# Patient Record
Sex: MALE | Race: WHITE
[De-identification: names, ages, dates, MRNs, and addresses within clinical notes are randomized per-mention and may not be internally consistent; named-entity substitution may affect disease eponyms.]

---

## 2017-04-11 ENCOUNTER — PRIOR ORIGINAL RECORDS (OUTPATIENT)
Dept: OTHER | Age: 67
End: 2017-04-11

## 2017-04-19 ENCOUNTER — HOSPITAL ENCOUNTER (OUTPATIENT)
Dept: CARDIOLOGY CLINIC | Facility: HOSPITAL | Age: 67
Discharge: HOME | End: 2017-04-19
Attending: INTERNAL MEDICINE

## 2017-04-19 ENCOUNTER — MYAURORA ACCOUNT LINK (OUTPATIENT)
Dept: OTHER | Age: 67
End: 2017-04-19

## 2017-04-19 DIAGNOSIS — I25.10 CORONARY ARTERIOSCLEROSIS: ICD-10-CM

## 2017-04-20 ENCOUNTER — PRIOR ORIGINAL RECORDS (OUTPATIENT)
Dept: OTHER | Age: 67
End: 2017-04-20

## 2017-06-06 ENCOUNTER — PRIOR ORIGINAL RECORDS (OUTPATIENT)
Dept: OTHER | Age: 67
End: 2017-06-06

## 2017-10-04 ENCOUNTER — PRIOR ORIGINAL RECORDS (OUTPATIENT)
Dept: OTHER | Age: 67
End: 2017-10-04

## 2017-10-05 ENCOUNTER — HOSPITAL ENCOUNTER (OUTPATIENT)
Dept: GENERAL RADIOLOGY | Facility: HOSPITAL | Age: 67
Discharge: HOME | End: 2017-10-05
Attending: INTERNAL MEDICINE
Payer: MEDICARE

## 2017-10-05 ENCOUNTER — PRIOR ORIGINAL RECORDS (OUTPATIENT)
Dept: OTHER | Age: 67
End: 2017-10-05

## 2017-10-05 ENCOUNTER — LAB ENCOUNTER (OUTPATIENT)
Dept: LAB | Facility: HOSPITAL | Age: 67
End: 2017-10-05
Attending: INTERNAL MEDICINE
Payer: MEDICARE

## 2017-10-05 DIAGNOSIS — Z01.811 PRE-OP CHEST EXAM: ICD-10-CM

## 2017-10-05 DIAGNOSIS — I25.10 CORONARY ATHEROSCLEROSIS OF NATIVE CORONARY ARTERY: Primary | ICD-10-CM

## 2017-10-05 LAB
ALKALINE PHOSPHATATE(ALK PHOS): 65 IU/L
BILIRUBIN TOTAL: 0.49 MG/DL
BUN: 16 MG/DL
CALCIUM: 9.3 MG/DL
CHLORIDE: 105 MEQ/L
CHOLESTEROL, TOTAL: 139 MG/DL
CREATININE, SERUM: 1.13 MG/DL
GLUCOSE: 96 MG/DL
HDL CHOLESTEROL: 47 MG/DL
LDL CHOLESTEROL: 58 MG/DL
POTASSIUM, SERUM: 4 MEQ/L
PROTEIN, TOTAL: 7.4 G/DL
SGOT (AST): 22 IU/L
SGPT (ALT): 28 IU/L
SODIUM: 143 MEQ/L
TRIGLYCERIDES: 171 MG/DL

## 2017-10-05 PROCEDURE — 80048 BASIC METABOLIC PNL TOTAL CA: CPT

## 2017-10-05 PROCEDURE — 36415 COLL VENOUS BLD VENIPUNCTURE: CPT

## 2017-10-05 PROCEDURE — 85025 COMPLETE CBC W/AUTO DIFF WBC: CPT

## 2017-10-05 PROCEDURE — 71020 XR CHEST PA + LAT CHEST (CPT=71020): CPT | Performed by: INTERNAL MEDICINE

## 2017-10-05 NOTE — PROGRESS NOTES
Tressa Gallo  : 1950  ACCOUNT:  [id number]  [phone]  PCP: Dr. Dickson Faustin     TODAY'S DATE: 10/05/2017  DICTATED BY:  Toni Stewart M.D.]    CHIEF COMPLAINT: [Followup of .  CAD, of native vessels.]    HPI: [On 10/05/2017, Quentin Ayala, a 74 yea PAST HISTORY: back surgery    PAST CV HISTORY: 9/2012, cardiac catheterization September 2012, drug eluting stent, dyslipidemia, LAD 6/2015, PTCA and PTCA/Stent    FAMILY HISTORY: Negative for premature CAD. Negative for AAA.     SOCIAL HISTORY: SMOKING PLAN:  1. 25 mg of Toprol. 2. Cardiac catheterization on Monday, or sooner if symptoms warrant.     Santo Odonnell M.D.  LAVINIA/aníbal - DD: 10/05/2017 - DT: 10/05/2017 - Job ID: [id number] - c: Dr. Og Marie

## 2017-10-06 LAB
BUN: 19 MG/DL
CALCIUM: 9.3 MG/DL
CHLORIDE: 105 MEQ/L
CREATININE, SERUM: 1.22 MG/DL
GLUCOSE: 77 MG/DL
HEMATOCRIT: 42.5 %
HEMOGLOBIN: 15.6 G/DL
PLATELETS: 227 K/UL
POTASSIUM, SERUM: 4.7 MEQ/L
RED BLOOD COUNT: 4.43 X 10-6/U
SODIUM: 139 MEQ/L
WHITE BLOOD COUNT: 7.3 X 10-3/U

## 2017-10-09 ENCOUNTER — HOSPITAL ENCOUNTER (OUTPATIENT)
Dept: INTERVENTIONAL RADIOLOGY/VASCULAR | Facility: HOSPITAL | Age: 67
Discharge: HOME | End: 2017-10-10
Attending: INTERNAL MEDICINE | Admitting: INTERNAL MEDICINE
Payer: MEDICARE

## 2017-10-09 DIAGNOSIS — R07.9 CHEST PAIN: ICD-10-CM

## 2017-10-09 DIAGNOSIS — I25.10 CAD (CORONARY ARTERY DISEASE): ICD-10-CM

## 2017-10-09 PROCEDURE — 85347 COAGULATION TIME ACTIVATED: CPT

## 2017-10-09 PROCEDURE — 027034Z DILATION OF CORONARY ARTERY, ONE ARTERY WITH DRUG-ELUTING INTRALUMINAL DEVICE, PERCUTANEOUS APPROACH: ICD-10-PCS | Performed by: INTERNAL MEDICINE

## 2017-10-09 PROCEDURE — B2111ZZ FLUOROSCOPY OF MULTIPLE CORONARY ARTERIES USING LOW OSMOLAR CONTRAST: ICD-10-PCS | Performed by: INTERNAL MEDICINE

## 2017-10-09 PROCEDURE — 93005 ELECTROCARDIOGRAM TRACING: CPT

## 2017-10-09 PROCEDURE — B2151ZZ FLUOROSCOPY OF LEFT HEART USING LOW OSMOLAR CONTRAST: ICD-10-PCS | Performed by: INTERNAL MEDICINE

## 2017-10-09 PROCEDURE — 99153 MOD SED SAME PHYS/QHP EA: CPT

## 2017-10-09 PROCEDURE — 90471 IMMUNIZATION ADMIN: CPT

## 2017-10-09 PROCEDURE — 02C03ZZ EXTIRPATION OF MATTER FROM CORONARY ARTERY, ONE ARTERY, PERCUTANEOUS APPROACH: ICD-10-PCS | Performed by: INTERNAL MEDICINE

## 2017-10-09 PROCEDURE — 93010 ELECTROCARDIOGRAM REPORT: CPT | Performed by: INTERNAL MEDICINE

## 2017-10-09 PROCEDURE — 4A023N7 MEASUREMENT OF CARDIAC SAMPLING AND PRESSURE, LEFT HEART, PERCUTANEOUS APPROACH: ICD-10-PCS | Performed by: INTERNAL MEDICINE

## 2017-10-09 PROCEDURE — 93458 L HRT ARTERY/VENTRICLE ANGIO: CPT

## 2017-10-09 PROCEDURE — 3E07317 INTRODUCTION OF OTHER THROMBOLYTIC INTO CORONARY ARTERY, PERCUTANEOUS APPROACH: ICD-10-PCS | Performed by: INTERNAL MEDICINE

## 2017-10-09 PROCEDURE — 99152 MOD SED SAME PHYS/QHP 5/>YRS: CPT

## 2017-10-09 RX ORDER — CLOPIDOGREL BISULFATE 75 MG/1
75 TABLET ORAL DAILY
Status: DISCONTINUED | OUTPATIENT
Start: 2017-10-10 | End: 2017-10-10

## 2017-10-09 RX ORDER — LIDOCAINE HYDROCHLORIDE 10 MG/ML
INJECTION, SOLUTION INFILTRATION; PERINEURAL
Status: COMPLETED
Start: 2017-10-09 | End: 2017-10-09

## 2017-10-09 RX ORDER — NICOTINE 21 MG/24HR
1 PATCH, TRANSDERMAL 24 HOURS TRANSDERMAL DAILY
Status: DISCONTINUED | OUTPATIENT
Start: 2017-10-09 | End: 2017-10-09 | Stop reason: HOSPADM

## 2017-10-09 RX ORDER — HEPARIN SODIUM 5000 [USP'U]/ML
INJECTION, SOLUTION INTRAVENOUS; SUBCUTANEOUS
Status: COMPLETED
Start: 2017-10-09 | End: 2017-10-09

## 2017-10-09 RX ORDER — CLOPIDOGREL BISULFATE 75 MG/1
75 TABLET ORAL DAILY
Status: DISCONTINUED | OUTPATIENT
Start: 2017-10-09 | End: 2017-10-09 | Stop reason: SDUPTHER

## 2017-10-09 RX ORDER — ASPIRIN 325 MG
325 TABLET, DELAYED RELEASE (ENTERIC COATED) ORAL DAILY
Status: DISCONTINUED | OUTPATIENT
Start: 2017-10-09 | End: 2017-10-10

## 2017-10-09 RX ORDER — SODIUM CHLORIDE 9 MG/ML
INJECTION, SOLUTION INTRAVENOUS CONTINUOUS
Status: DISCONTINUED | OUTPATIENT
Start: 2017-10-09 | End: 2017-10-10

## 2017-10-09 RX ORDER — MIDAZOLAM HYDROCHLORIDE 1 MG/ML
INJECTION INTRAMUSCULAR; INTRAVENOUS
Status: COMPLETED
Start: 2017-10-09 | End: 2017-10-09

## 2017-10-09 RX ORDER — FLUOXETINE HYDROCHLORIDE 20 MG/1
20 CAPSULE ORAL DAILY
Status: DISCONTINUED | OUTPATIENT
Start: 2017-10-09 | End: 2017-10-10

## 2017-10-09 RX ORDER — SODIUM CHLORIDE 9 MG/ML
INJECTION, SOLUTION INTRAVENOUS CONTINUOUS
Status: ACTIVE | OUTPATIENT
Start: 2017-10-09 | End: 2017-10-09

## 2017-10-09 RX ORDER — ROSUVASTATIN CALCIUM 20 MG/1
20 TABLET, COATED ORAL NIGHTLY
Status: DISCONTINUED | OUTPATIENT
Start: 2017-10-09 | End: 2017-10-10

## 2017-10-09 RX ORDER — ALPRAZOLAM 1 MG/1
1 TABLET ORAL 3 TIMES DAILY PRN
Status: DISCONTINUED | OUTPATIENT
Start: 2017-10-09 | End: 2017-10-10

## 2017-10-09 RX ADMIN — ROSUVASTATIN CALCIUM 20 MG: 20 TABLET, COATED ORAL at 20:20:00

## 2017-10-09 RX ADMIN — SODIUM CHLORIDE: 9 INJECTION, SOLUTION INTRAVENOUS at 12:00:00

## 2017-10-09 RX ADMIN — ALPRAZOLAM 1 MG: 1 TABLET ORAL at 20:23:00

## 2017-10-09 RX ADMIN — FLUOXETINE HYDROCHLORIDE 20 MG: 20 CAPSULE ORAL at 20:20:00

## 2017-10-09 RX ADMIN — NICOTINE 1 PATCH: 21 MG/24HR PATCH, TRANSDERMAL 24 HOURS TRANSDERMAL at 15:15:00

## 2017-10-09 NOTE — H&P
History & Physical Examination    Patient Name: Steven La Coste  MRN: [id number]  [facility]: [id number]  YOB: 1950    Diagnosis: Coronary artery disease, unstable angina    Present Illness: Cardiac catheterization      Prescriptions Prior to Admission: Comment: angiogram, negative  7/31/12: OTHER SURGICAL HISTORY      Comment: Vahid Hopper  09/12: OTHER SURGICAL HISTORY      Comment: stent  Family History   Problem Relation Age of Onset   • Heart Disorder Father    • Heart Disease Father    • Cancer

## 2017-10-09 NOTE — PROCEDURES
[facility]  PCI Procedure Note    Gerry Rouse Location: Cath Lab    CSN [id number] MRN [id number]   Admission Date 10/9/2017 Procedure Date 10/9/2017   Attending Physician Colette Burroughs MD Procedure Physician Gabe Grant MD     Pre-Procedure Cinthya Louise device. IV was maintained by RN and moderate conscious sedation of Versed and fentanyl was given. Patient was assessed and monitoring of oxygen, heart rate and blood pressure by nurse and myself during the exam from 1252 -1337 PM      Impression:  1.

## 2017-10-09 NOTE — PLAN OF CARE
Electrolytes maintained within normal limits Progressing      Patient/Family Long Term Goal Progressing      Patient/Family Short Term Goal Progressing      Incision(s), wounds(s) or drain site(s) healing without S/S of infection Progressing    RECEIVED FR

## 2017-10-09 NOTE — PROGRESS NOTES
Rc'd pt from cath lab in stable condition. VSS. Dressing to right groin with angio-seal is soft, clean and dry. No bleeding or hematoma. Angiomax just completed. Pt to remain flat for 2hrs. Pt denies c/o pain or discomfort. Wife at bedside.  Dr Sabiha Watson at be

## 2017-10-10 ENCOUNTER — PRIOR ORIGINAL RECORDS (OUTPATIENT)
Dept: OTHER | Age: 67
End: 2017-10-10

## 2017-10-10 VITALS
OXYGEN SATURATION: 100 % | HEIGHT: 72 IN | DIASTOLIC BLOOD PRESSURE: 63 MMHG | RESPIRATION RATE: 17 BRPM | HEART RATE: 59 BPM | WEIGHT: 187 LBS | TEMPERATURE: 98 F | BODY MASS INDEX: 25.33 KG/M2 | SYSTOLIC BLOOD PRESSURE: 101 MMHG

## 2017-10-10 PROCEDURE — 99211 OFF/OP EST MAY X REQ PHY/QHP: CPT

## 2017-10-10 PROCEDURE — 85027 COMPLETE CBC AUTOMATED: CPT | Performed by: INTERNAL MEDICINE

## 2017-10-10 PROCEDURE — 80048 BASIC METABOLIC PNL TOTAL CA: CPT | Performed by: INTERNAL MEDICINE

## 2017-10-10 RX ADMIN — ASPIRIN 325 MG: 325 MG TABLET, DELAYED RELEASE (ENTERIC COATED) ORAL at 09:15:00

## 2017-10-10 RX ADMIN — CLOPIDOGREL BISULFATE 75 MG: 75 TABLET ORAL at 09:15:00

## 2017-10-10 RX ADMIN — FLUOXETINE HYDROCHLORIDE 20 MG: 20 CAPSULE ORAL at 09:16:00

## 2017-10-10 NOTE — CARDIAC REHAB
CAD education complete with patient and family, refused Phase II appointment at this time.  Phone number placed in Patient Instructions, told patient to follow up with us after seeing his cardiologist.

## 2017-10-10 NOTE — PLAN OF CARE
Patient is alert and oriented. Saturates well on room air, NSR on the tele. Patient denies any pain or SOB. Right groin is soft, non tender, no swelling noted. Patient ambulates ad edwar. POC updated with patient.  Call light within reach, will continue to

## 2017-10-10 NOTE — PLAN OF CARE
SKIN/TISSUE INTEGRITY - ADULT    • Incision(s), wounds(s) or drain site(s) healing without S/S of infection Progressing            S/P angiogram  JIM to LAD  Rt groin angioseal.  Site clean, dry and soft. Patient denies pain.     Patient given 74 and over

## 2017-10-10 NOTE — DIETARY NOTE
Nutrition Short Note   Dietitian consult received per cardiac rehab standing order. Pt to be educated by cardiac rehab staff and encouraged to attend outpatient classes taught by JOSUE. JOSUE available PRN.      Robbie Smith RD, LDN  Pager [phone]

## 2017-10-10 NOTE — PROGRESS NOTES
[facility]  Progress Note    Ney Gibson Patient Status:  Outpatient in a Bed    1950 MRN [id number]   [facility] 8NE-A Attending Ochoa Madrigal MD   Hosp Day # 0 PCP Oscar Sanchez MD     Subjective:  Patient seen and examin Medications:    Current Facility-Administered Medications:  0.9%  NaCl infusion  Intravenous Continuous   Clopidogrel Bisulfate (PLAVIX) tab 75 mg 75 mg Oral Daily   ALPRAZolam (XANAX) tab 1 mg 1 mg Oral TID PRN   aspirin EC EC tab 325 mg 325 mg Oral

## 2017-10-13 ENCOUNTER — PRIOR ORIGINAL RECORDS (OUTPATIENT)
Dept: OTHER | Age: 67
End: 2017-10-13

## 2017-10-23 ENCOUNTER — MYAURORA ACCOUNT LINK (OUTPATIENT)
Dept: OTHER | Age: 67
End: 2017-10-23

## 2017-10-23 ENCOUNTER — PRIOR ORIGINAL RECORDS (OUTPATIENT)
Dept: OTHER | Age: 67
End: 2017-10-23

## 2017-11-07 LAB
BUN: 11 MG/DL
CALCIUM: 8.5 MG/DL
CHLORIDE: 108 MEQ/L
CREATININE, SERUM: 1 MG/DL
GLUCOSE: 111 MG/DL
HEMOGLOBIN A1C: 5 %
POTASSIUM, SERUM: 3.8 MEQ/L
SODIUM: 137 MEQ/L
THYROID STIMULATING HORMONE: 1.82 MLU/L
VITAMIN D 25-OH: 31.63 NG/ML

## 2018-03-07 ENCOUNTER — PRIOR ORIGINAL RECORDS (OUTPATIENT)
Dept: OTHER | Age: 68
End: 2018-03-07

## 2018-04-25 LAB
ALKALINE PHOSPHATATE(ALK PHOS): 65 IU/L
BILIRUBIN TOTAL: 0.63 MG/DL
BUN: 17 MG/DL
CALCIUM: 9.3 MG/DL
CHLORIDE: 104 MEQ/L
CHOLESTEROL, TOTAL: 142 MG/DL
CREATININE, SERUM: 1.17 MG/DL
GLUCOSE: 80 MG/DL
HDL CHOLESTEROL: 43 MG/DL
LDL CHOLESTEROL: 67 MG/DL
POTASSIUM, SERUM: 4 MEQ/L
PROTEIN, TOTAL: 7.1 G/DL
SGOT (AST): 24 IU/L
SGPT (ALT): 31 IU/L
SODIUM: 142 MEQ/L
THYROID STIMULATING HORMONE: 1.45 MLU/L
TRIGLYCERIDES: 159 MG/DL

## 2018-04-26 ENCOUNTER — PRIOR ORIGINAL RECORDS (OUTPATIENT)
Dept: OTHER | Age: 68
End: 2018-04-26

## 2018-11-01 ENCOUNTER — PRIOR ORIGINAL RECORDS (OUTPATIENT)
Dept: OTHER | Age: 68
End: 2018-11-01

## 2018-11-01 ENCOUNTER — MYAURORA ACCOUNT LINK (OUTPATIENT)
Dept: OTHER | Age: 68
End: 2018-11-01

## 2019-01-01 ENCOUNTER — EXTERNAL RECORD (OUTPATIENT)
Dept: HEALTH INFORMATION MANAGEMENT | Facility: OTHER | Age: 69
End: 2019-01-01

## 2019-01-02 PROCEDURE — 87081 CULTURE SCREEN ONLY: CPT | Performed by: PHYSICIAN ASSISTANT

## 2019-01-10 PROCEDURE — 84154 ASSAY OF PSA FREE: CPT | Performed by: INTERNAL MEDICINE

## 2019-01-10 PROCEDURE — 84153 ASSAY OF PSA TOTAL: CPT | Performed by: INTERNAL MEDICINE

## 2019-02-28 VITALS
DIASTOLIC BLOOD PRESSURE: 82 MMHG | SYSTOLIC BLOOD PRESSURE: 114 MMHG | HEIGHT: 73 IN | BODY MASS INDEX: 24.78 KG/M2 | HEART RATE: 96 BPM | WEIGHT: 187 LBS

## 2019-02-28 VITALS
HEIGHT: 73 IN | DIASTOLIC BLOOD PRESSURE: 80 MMHG | BODY MASS INDEX: 24.65 KG/M2 | SYSTOLIC BLOOD PRESSURE: 122 MMHG | HEART RATE: 92 BPM | WEIGHT: 186 LBS

## 2019-02-28 VITALS
SYSTOLIC BLOOD PRESSURE: 120 MMHG | WEIGHT: 188 LBS | HEART RATE: 84 BPM | BODY MASS INDEX: 24.92 KG/M2 | HEIGHT: 73 IN | DIASTOLIC BLOOD PRESSURE: 70 MMHG

## 2019-02-28 VITALS
SYSTOLIC BLOOD PRESSURE: 120 MMHG | HEART RATE: 80 BPM | WEIGHT: 183 LBS | HEIGHT: 73 IN | BODY MASS INDEX: 24.25 KG/M2 | DIASTOLIC BLOOD PRESSURE: 82 MMHG

## 2019-03-01 VITALS
DIASTOLIC BLOOD PRESSURE: 62 MMHG | BODY MASS INDEX: 25.47 KG/M2 | HEART RATE: 83 BPM | WEIGHT: 188 LBS | SYSTOLIC BLOOD PRESSURE: 128 MMHG | HEIGHT: 72 IN

## 2019-03-22 RX ORDER — FLUOXETINE 20 MG/1
TABLET, FILM COATED ORAL
COMMUNITY

## 2019-03-22 RX ORDER — ROSUVASTATIN CALCIUM 10 MG/1
1 TABLET, COATED ORAL AT BEDTIME
COMMUNITY
Start: 2012-09-20

## 2019-03-22 RX ORDER — CLOPIDOGREL BISULFATE 75 MG/1
1 TABLET ORAL DAILY
COMMUNITY
Start: 2017-08-09 | End: 2021-02-22 | Stop reason: SDUPTHER

## 2019-03-22 RX ORDER — ALPRAZOLAM 2 MG/1
TABLET ORAL
COMMUNITY

## 2019-04-29 ENCOUNTER — TELEPHONE (OUTPATIENT)
Dept: CARDIOLOGY | Age: 69
End: 2019-04-29

## 2019-05-08 ENCOUNTER — OFFICE VISIT (OUTPATIENT)
Dept: CARDIOLOGY | Age: 69
End: 2019-05-08

## 2019-05-08 VITALS
HEART RATE: 72 BPM | WEIGHT: 190 LBS | SYSTOLIC BLOOD PRESSURE: 122 MMHG | BODY MASS INDEX: 25.73 KG/M2 | HEIGHT: 72 IN | DIASTOLIC BLOOD PRESSURE: 70 MMHG

## 2019-05-08 DIAGNOSIS — E78.2 MIXED HYPERLIPIDEMIA: ICD-10-CM

## 2019-05-08 DIAGNOSIS — I25.118 CORONARY ARTERY DISEASE OF NATIVE ARTERY OF NATIVE HEART WITH STABLE ANGINA PECTORIS (CMD): Primary | ICD-10-CM

## 2019-05-08 PROCEDURE — 99211 OFF/OP EST MAY X REQ PHY/QHP: CPT | Performed by: INTERNAL MEDICINE

## 2019-05-08 ASSESSMENT — ENCOUNTER SYMPTOMS
BRUISES/BLEEDS EASILY: 0
WEIGHT GAIN: 0
HEMOPTYSIS: 0
CHILLS: 0
WEIGHT LOSS: 0
COUGH: 0
ALLERGIC/IMMUNOLOGIC COMMENTS: NO NEW FOOD ALLERGIES
SUSPICIOUS LESIONS: 0
HEMATOCHEZIA: 0
FEVER: 0

## 2019-06-05 ENCOUNTER — TELEPHONE (OUTPATIENT)
Dept: CARDIOLOGY | Age: 69
End: 2019-06-05

## 2019-06-06 ENCOUNTER — TELEPHONE (OUTPATIENT)
Dept: CARDIOLOGY | Age: 69
End: 2019-06-06

## 2019-06-06 ENCOUNTER — OFFICE VISIT (OUTPATIENT)
Dept: CARDIOLOGY | Age: 69
End: 2019-06-06

## 2019-06-06 VITALS
HEART RATE: 72 BPM | HEIGHT: 72 IN | SYSTOLIC BLOOD PRESSURE: 116 MMHG | BODY MASS INDEX: 25.87 KG/M2 | DIASTOLIC BLOOD PRESSURE: 78 MMHG | WEIGHT: 191 LBS

## 2019-06-06 DIAGNOSIS — E78.2 MIXED HYPERLIPIDEMIA: ICD-10-CM

## 2019-06-06 DIAGNOSIS — I65.29 STENOSIS OF CAROTID ARTERY, UNSPECIFIED LATERALITY: ICD-10-CM

## 2019-06-06 DIAGNOSIS — R07.9 CHEST PAIN, UNSPECIFIED TYPE: ICD-10-CM

## 2019-06-06 DIAGNOSIS — Z95.5 PRESENCE OF STENT IN LAD CORONARY ARTERY: ICD-10-CM

## 2019-06-06 DIAGNOSIS — Z87.891 HISTORY OF SMOKING: ICD-10-CM

## 2019-06-06 DIAGNOSIS — I25.10 CORONARY ARTERY DISEASE INVOLVING NATIVE HEART WITHOUT ANGINA PECTORIS, UNSPECIFIED VESSEL OR LESION TYPE: Primary | ICD-10-CM

## 2019-06-06 PROCEDURE — 99214 OFFICE O/P EST MOD 30 MIN: CPT | Performed by: INTERNAL MEDICINE

## 2019-06-06 PROCEDURE — 93000 ELECTROCARDIOGRAM COMPLETE: CPT | Performed by: INTERNAL MEDICINE

## 2019-06-06 SDOH — HEALTH STABILITY: PHYSICAL HEALTH: ON AVERAGE, HOW MANY MINUTES DO YOU ENGAGE IN EXERCISE AT THIS LEVEL?: 0 MIN

## 2019-06-06 SDOH — HEALTH STABILITY: PHYSICAL HEALTH: ON AVERAGE, HOW MANY DAYS PER WEEK DO YOU ENGAGE IN MODERATE TO STRENUOUS EXERCISE (LIKE A BRISK WALK)?: 0 DAYS

## 2019-06-06 ASSESSMENT — ENCOUNTER SYMPTOMS
SHORTNESS OF BREATH: 1
FEVER: 0
WEIGHT LOSS: 0
WEIGHT GAIN: 0
CHILLS: 0
HEMATOCHEZIA: 0
HEMOPTYSIS: 0
BRUISES/BLEEDS EASILY: 0
COUGH: 0
SUSPICIOUS LESIONS: 0
ALLERGIC/IMMUNOLOGIC COMMENTS: NO NEW FOOD ALLERGIES

## 2019-06-11 ENCOUNTER — HOSPITAL ENCOUNTER (OUTPATIENT)
Dept: GENERAL RADIOLOGY | Facility: HOSPITAL | Age: 69
Discharge: HOME | End: 2019-06-11
Attending: INTERNAL MEDICINE
Payer: MEDICARE

## 2019-06-11 ENCOUNTER — LAB ENCOUNTER (OUTPATIENT)
Dept: LAB | Facility: HOSPITAL | Age: 69
End: 2019-06-11
Attending: INTERNAL MEDICINE
Payer: MEDICARE

## 2019-06-11 DIAGNOSIS — I25.10 CAD (CORONARY ARTERY DISEASE): ICD-10-CM

## 2019-06-11 DIAGNOSIS — R07.9 CHEST PAIN, UNSPECIFIED: Primary | ICD-10-CM

## 2019-06-11 DIAGNOSIS — R07.9 CHEST PAIN, UNSPECIFIED TYPE: ICD-10-CM

## 2019-06-11 PROCEDURE — 80048 BASIC METABOLIC PNL TOTAL CA: CPT

## 2019-06-11 PROCEDURE — 36415 COLL VENOUS BLD VENIPUNCTURE: CPT

## 2019-06-11 PROCEDURE — 85027 COMPLETE CBC AUTOMATED: CPT

## 2019-06-11 PROCEDURE — 71046 X-RAY EXAM CHEST 2 VIEWS: CPT | Performed by: INTERNAL MEDICINE

## 2019-06-13 DIAGNOSIS — I25.10 CORONARY ARTERY DISEASE INVOLVING NATIVE HEART WITHOUT ANGINA PECTORIS, UNSPECIFIED VESSEL OR LESION TYPE: ICD-10-CM

## 2019-06-13 DIAGNOSIS — R07.9 CHEST PAIN, UNSPECIFIED TYPE: ICD-10-CM

## 2019-06-13 DIAGNOSIS — I65.29 STENOSIS OF CAROTID ARTERY, UNSPECIFIED LATERALITY: ICD-10-CM

## 2019-06-18 NOTE — H&P
Miguel Blas MD - 2019 9:15 AM CDT  Formatting of this note might be different from the original.  2019    [address] [place] [address] [facility]  Clinic Note    Lynne Mccord  : 1950  PCP: medications for this visit. Review of Systems:  Review of Systems   Constitution: Negative for chills, fever, weight gain and weight loss. HENT: Negative for hearing loss.    Eyes:   Patient denies significant visual changes   Cardiovascular: Positive or lesion type   2. Stenosis of carotid artery, unspecified laterality   3. Mixed hyperlipidemia   4. History of smoking   5.  Presence of stent in LAD coronary artery     82-year-old gentleman with long history of coronary artery disease, history of multip

## 2019-06-19 ENCOUNTER — APPOINTMENT (OUTPATIENT)
Dept: CARDIOLOGY | Age: 69
End: 2019-06-19

## 2019-06-19 ENCOUNTER — HOSPITAL ENCOUNTER (OUTPATIENT)
Dept: INTERVENTIONAL RADIOLOGY/VASCULAR | Facility: HOSPITAL | Age: 69
Discharge: HOME | End: 2019-06-19
Attending: INTERNAL MEDICINE | Admitting: INTERNAL MEDICINE
Payer: MEDICARE

## 2019-06-19 VITALS
SYSTOLIC BLOOD PRESSURE: 103 MMHG | RESPIRATION RATE: 17 BRPM | OXYGEN SATURATION: 95 % | DIASTOLIC BLOOD PRESSURE: 65 MMHG | HEART RATE: 62 BPM | HEIGHT: 72 IN | WEIGHT: 191 LBS | TEMPERATURE: 98 F | BODY MASS INDEX: 25.87 KG/M2

## 2019-06-19 DIAGNOSIS — R07.9 CHEST PAIN: ICD-10-CM

## 2019-06-19 DIAGNOSIS — I25.10 CAD (CORONARY ARTERY DISEASE): ICD-10-CM

## 2019-06-19 PROCEDURE — B2111ZZ FLUOROSCOPY OF MULTIPLE CORONARY ARTERIES USING LOW OSMOLAR CONTRAST: ICD-10-PCS | Performed by: INTERNAL MEDICINE

## 2019-06-19 PROCEDURE — 93458 L HRT ARTERY/VENTRICLE ANGIO: CPT

## 2019-06-19 PROCEDURE — 99152 MOD SED SAME PHYS/QHP 5/>YRS: CPT

## 2019-06-19 PROCEDURE — 93458 L HRT ARTERY/VENTRICLE ANGIO: CPT | Performed by: INTERNAL MEDICINE

## 2019-06-19 PROCEDURE — 4A023N7 MEASUREMENT OF CARDIAC SAMPLING AND PRESSURE, LEFT HEART, PERCUTANEOUS APPROACH: ICD-10-PCS | Performed by: INTERNAL MEDICINE

## 2019-06-19 PROCEDURE — B2151ZZ FLUOROSCOPY OF LEFT HEART USING LOW OSMOLAR CONTRAST: ICD-10-PCS | Performed by: INTERNAL MEDICINE

## 2019-06-19 PROCEDURE — 99153 MOD SED SAME PHYS/QHP EA: CPT

## 2019-06-19 RX ORDER — LIDOCAINE HYDROCHLORIDE 10 MG/ML
INJECTION, SOLUTION EPIDURAL; INFILTRATION; INTRACAUDAL; PERINEURAL
Status: COMPLETED
Start: 2019-06-19 | End: 2019-06-19

## 2019-06-19 RX ORDER — VERAPAMIL HYDROCHLORIDE 2.5 MG/ML
INJECTION, SOLUTION INTRAVENOUS
Status: COMPLETED
Start: 2019-06-19 | End: 2019-06-19

## 2019-06-19 RX ORDER — HEPARIN SODIUM 5000 [USP'U]/ML
INJECTION, SOLUTION INTRAVENOUS; SUBCUTANEOUS
Status: COMPLETED
Start: 2019-06-19 | End: 2019-06-19

## 2019-06-19 RX ORDER — ASPIRIN 81 MG/1
TABLET, CHEWABLE ORAL
Status: COMPLETED
Start: 2019-06-19 | End: 2019-06-19

## 2019-06-19 RX ORDER — ASPIRIN 81 MG/1
324 TABLET, CHEWABLE ORAL ONCE
Status: COMPLETED | OUTPATIENT
Start: 2019-06-19 | End: 2019-06-19

## 2019-06-19 RX ORDER — SODIUM CHLORIDE 9 MG/ML
INJECTION, SOLUTION INTRAVENOUS CONTINUOUS
Status: DISCONTINUED | OUTPATIENT
Start: 2019-06-19 | End: 2019-06-19

## 2019-06-19 RX ORDER — SODIUM CHLORIDE 9 MG/ML
INJECTION, SOLUTION INTRAVENOUS
Status: COMPLETED | OUTPATIENT
Start: 2019-06-20 | End: 2019-06-19

## 2019-06-19 RX ORDER — MIDAZOLAM HYDROCHLORIDE 1 MG/ML
INJECTION INTRAMUSCULAR; INTRAVENOUS
Status: COMPLETED
Start: 2019-06-19 | End: 2019-06-19

## 2019-06-19 RX ORDER — ASPIRIN 81 MG/1
81 TABLET ORAL DAILY
Status: DISCONTINUED | OUTPATIENT
Start: 2019-06-20 | End: 2019-06-19

## 2019-06-19 RX ADMIN — SODIUM CHLORIDE: 9 INJECTION, SOLUTION INTRAVENOUS at 12:45:00

## 2019-06-19 RX ADMIN — ASPIRIN 324 MG: 81 TABLET, CHEWABLE ORAL at 09:47:00

## 2019-06-19 RX ADMIN — SODIUM CHLORIDE: 9 INJECTION, SOLUTION INTRAVENOUS at 09:32:00

## 2019-06-19 NOTE — PROGRESS NOTES
Pt s/p LHC with Dr. Kenton Lora. Pt recovered in holding. Pt arrived from cath lab with right radial vasc band in place with 13ml. After 1 hour in recovery air was removed from vasc band in increments over time until the band was completely removed.  No bleeding

## 2019-06-19 NOTE — H&P
History & Physical Examination    Patient Name: Don Rios  MRN: [id number]  CSN: [id number]  YOB: 1950    Diagnosis: Coronary artery disease, history of multiple prior interventions in the left anterior descending artery, recurrent symptoms HISTORY  09/12    stent     Family History   Problem Relation Age of Onset   • Heart Disorder Father    • Heart Disease Father    • Cancer Father    • Other (Other) Father    • Other (Other) Mother         CVA   • Stroke Sister    • Other (Other) Sister

## 2019-06-19 NOTE — PROCEDURES
[facility]  Angio via Radial Artery Procedure Note    Aldo Kerr Location: Cath Lab    CSN [id number] MRN [id number]   Admission Date 6/19/2019 Procedure Date 6/19/2019   Attending Physician Madelin Garcia MD Procedure Physician Trang Urrutia MD a right dominant system. The previously placed stents in the right coronary artery are widely patent. There is no evidence of significant in-stent restenosis  2 the left main coronary artery has no significant disease  3.   The circumflex has no significa

## 2019-06-28 ENCOUNTER — HOSPITAL ENCOUNTER (OUTPATIENT)
Dept: CARDIOLOGY CLINIC | Facility: HOSPITAL | Age: 69
Discharge: HOME | End: 2019-06-28
Attending: INTERNAL MEDICINE
Payer: MEDICARE

## 2019-06-28 DIAGNOSIS — I65.29 STENOSIS OF CAROTID ARTERY, UNSPECIFIED LATERALITY: ICD-10-CM

## 2019-06-28 PROCEDURE — 93880 EXTRACRANIAL BILAT STUDY: CPT | Performed by: INTERNAL MEDICINE

## 2019-07-02 ENCOUNTER — TELEPHONE (OUTPATIENT)
Dept: CARDIOLOGY | Age: 69
End: 2019-07-02

## 2019-07-15 ENCOUNTER — OFFICE VISIT (OUTPATIENT)
Dept: CARDIOLOGY | Age: 69
End: 2019-07-15

## 2019-07-15 VITALS — DIASTOLIC BLOOD PRESSURE: 66 MMHG | SYSTOLIC BLOOD PRESSURE: 100 MMHG | HEART RATE: 60 BPM

## 2019-07-15 DIAGNOSIS — I65.29 STENOSIS OF CAROTID ARTERY, UNSPECIFIED LATERALITY: ICD-10-CM

## 2019-07-15 DIAGNOSIS — Z87.891 HISTORY OF SMOKING: ICD-10-CM

## 2019-07-15 DIAGNOSIS — E78.2 MIXED HYPERLIPIDEMIA: ICD-10-CM

## 2019-07-15 DIAGNOSIS — Z95.5 PRESENCE OF DRUG COATED STENT IN RIGHT CORONARY ARTERY: ICD-10-CM

## 2019-07-15 DIAGNOSIS — I25.10 CORONARY ARTERY DISEASE INVOLVING NATIVE HEART WITHOUT ANGINA PECTORIS, UNSPECIFIED VESSEL OR LESION TYPE: Primary | ICD-10-CM

## 2019-07-15 DIAGNOSIS — Z95.5 PRESENCE OF STENT IN LAD CORONARY ARTERY: ICD-10-CM

## 2019-07-15 PROCEDURE — 99215 OFFICE O/P EST HI 40 MIN: CPT | Performed by: INTERNAL MEDICINE

## 2019-07-15 ASSESSMENT — ENCOUNTER SYMPTOMS
FEVER: 0
HEMATOCHEZIA: 0
CHILLS: 0
WEIGHT GAIN: 0
COUGH: 0
SUSPICIOUS LESIONS: 0
ALLERGIC/IMMUNOLOGIC COMMENTS: NO NEW FOOD ALLERGIES
WEIGHT LOSS: 0
BRUISES/BLEEDS EASILY: 0
HEMOPTYSIS: 0

## 2019-10-17 ENCOUNTER — TELEPHONE (OUTPATIENT)
Dept: CARDIOLOGY | Age: 69
End: 2019-10-17

## 2019-10-30 PROCEDURE — 88305 TISSUE EXAM BY PATHOLOGIST: CPT | Performed by: INTERNAL MEDICINE

## 2020-01-01 ENCOUNTER — EXTERNAL RECORD (OUTPATIENT)
Dept: OTHER | Age: 70
End: 2020-01-01

## 2020-07-20 ENCOUNTER — OFFICE VISIT (OUTPATIENT)
Dept: CARDIOLOGY | Age: 70
End: 2020-07-20

## 2020-07-20 VITALS
SYSTOLIC BLOOD PRESSURE: 120 MMHG | WEIGHT: 188 LBS | DIASTOLIC BLOOD PRESSURE: 70 MMHG | HEIGHT: 72 IN | HEART RATE: 70 BPM | BODY MASS INDEX: 25.47 KG/M2

## 2020-07-20 DIAGNOSIS — I65.29 STENOSIS OF CAROTID ARTERY, UNSPECIFIED LATERALITY: ICD-10-CM

## 2020-07-20 DIAGNOSIS — E78.2 MIXED HYPERLIPIDEMIA: ICD-10-CM

## 2020-07-20 DIAGNOSIS — Z95.5 PRESENCE OF DRUG COATED STENT IN RIGHT CORONARY ARTERY: ICD-10-CM

## 2020-07-20 DIAGNOSIS — I25.118 CORONARY ARTERY DISEASE OF NATIVE ARTERY OF NATIVE HEART WITH STABLE ANGINA PECTORIS (CMD): ICD-10-CM

## 2020-07-20 DIAGNOSIS — Z87.891 HISTORY OF SMOKING: ICD-10-CM

## 2020-07-20 DIAGNOSIS — Z95.5 PRESENCE OF STENT IN LAD CORONARY ARTERY: Primary | ICD-10-CM

## 2020-07-20 PROCEDURE — 99442 TELEPHONE E&M BY PHYSICIAN EST PT NOT ORIG PREV 7 DAYS 11-20 MIN: CPT | Performed by: INTERNAL MEDICINE

## 2020-07-20 ASSESSMENT — ENCOUNTER SYMPTOMS
ALLERGIC/IMMUNOLOGIC COMMENTS: NO NEW FOOD ALLERGIES
FEVER: 0
HEMOPTYSIS: 0
WEIGHT LOSS: 0
SUSPICIOUS LESIONS: 0
CHILLS: 0
WEIGHT GAIN: 0
BRUISES/BLEEDS EASILY: 0
COUGH: 0
HEMATOCHEZIA: 0

## 2020-07-20 ASSESSMENT — PATIENT HEALTH QUESTIONNAIRE - PHQ9
2. FEELING DOWN, DEPRESSED OR HOPELESS: NOT AT ALL
1. LITTLE INTEREST OR PLEASURE IN DOING THINGS: NOT AT ALL
CLINICAL INTERPRETATION OF PHQ9 SCORE: NO FURTHER SCREENING NEEDED
CLINICAL INTERPRETATION OF PHQ2 SCORE: NO FURTHER SCREENING NEEDED
SUM OF ALL RESPONSES TO PHQ9 QUESTIONS 1 AND 2: 0
SUM OF ALL RESPONSES TO PHQ9 QUESTIONS 1 AND 2: 0

## 2020-08-24 PROBLEM — Z95.5 PRESENCE OF STENT IN LAD CORONARY ARTERY: Status: ACTIVE | Noted: 2019-06-06

## 2020-08-25 ENCOUNTER — TELEPHONE (OUTPATIENT)
Dept: CARDIOLOGY | Age: 70
End: 2020-08-25

## 2020-09-10 ENCOUNTER — HOSPITAL ENCOUNTER (OUTPATIENT)
Dept: CT IMAGING | Facility: HOSPITAL | Age: 70
Discharge: HOME | End: 2020-09-10
Attending: INTERNAL MEDICINE
Payer: MEDICARE

## 2020-09-10 VITALS — HEART RATE: 64 BPM | SYSTOLIC BLOOD PRESSURE: 97 MMHG | DIASTOLIC BLOOD PRESSURE: 62 MMHG

## 2020-09-10 DIAGNOSIS — E78.2 MIXED HYPERLIPIDEMIA: ICD-10-CM

## 2020-09-10 DIAGNOSIS — Z87.891 HISTORY OF SMOKING: ICD-10-CM

## 2020-09-10 DIAGNOSIS — I25.118: ICD-10-CM

## 2020-09-10 DIAGNOSIS — I65.29 CAROTID ARTERY STENOSIS: ICD-10-CM

## 2020-09-10 LAB — CREAT BLD-MCNC: 1.2 MG/DL (ref 0.7–1.3)

## 2020-09-10 PROCEDURE — 75574 CT ANGIO HRT W/3D IMAGE: CPT | Performed by: INTERNAL MEDICINE

## 2020-09-10 PROCEDURE — 82565 ASSAY OF CREATININE: CPT

## 2020-09-10 RX ORDER — NITROGLYCERIN 0.4 MG/1
0.4 TABLET SUBLINGUAL ONCE
Status: COMPLETED | OUTPATIENT
Start: 2020-09-10 | End: 2020-09-10

## 2020-09-10 RX ORDER — NITROGLYCERIN 0.4 MG/1
TABLET SUBLINGUAL
Status: DISPENSED
Start: 2020-09-10 | End: 2020-09-10

## 2020-09-10 RX ADMIN — NITROGLYCERIN 0.4 MG: 0.4 TABLET SUBLINGUAL at 08:29:00

## 2020-09-18 DIAGNOSIS — I25.118 CORONARY ARTERY DISEASE OF NATIVE ARTERY OF NATIVE HEART WITH STABLE ANGINA PECTORIS (CMD): ICD-10-CM

## 2020-09-18 DIAGNOSIS — Z87.891 HISTORY OF SMOKING: ICD-10-CM

## 2020-09-18 DIAGNOSIS — E78.2 MIXED HYPERLIPIDEMIA: ICD-10-CM

## 2020-09-18 DIAGNOSIS — I65.29 STENOSIS OF CAROTID ARTERY, UNSPECIFIED LATERALITY: ICD-10-CM

## 2020-09-18 PROCEDURE — X1094 CTA GATED CORONARY W CALCIUM SCORING: HCPCS | Performed by: INTERNAL MEDICINE

## 2020-10-02 ENCOUNTER — TELEPHONE (OUTPATIENT)
Dept: CARDIOLOGY | Age: 70
End: 2020-10-02

## 2020-10-02 DIAGNOSIS — R07.9 CHEST PAIN, UNSPECIFIED TYPE: Primary | ICD-10-CM

## 2020-10-09 ENCOUNTER — TELEPHONE (OUTPATIENT)
Dept: CARDIOLOGY | Age: 70
End: 2020-10-09

## 2020-10-15 NOTE — HISTORICAL OFFICE NOTE
Progress Notes  - documented in this encounter  Vasquez Catalan MD - 07/20/2020 11:15 AM CDT  Formatting of this note might be different from the original.    [address] [facility]    PCP: Sagrario De La Torre MD    Chief Complaint   Patient presents with Substance Use Topics   • Alcohol use: Yes   Comment: occasional   • Drug use: Never       Allergies  ALLERGIES:  No Known Allergies    Presenting Medications  Current Medications   ALPRAZOLAM (XANAX) 2 MG TABLET three times daily   CLOPIDOGREL (PLAVIX) 7 6 months. He is been having some chest discomfort. States is about the same as it was a year ago we did an angiogram of his stents were patent.  I advised him that there is any change in symptoms or worsening chest pain increasing fatigue shortness of breat

## 2020-10-17 ENCOUNTER — APPOINTMENT (OUTPATIENT)
Dept: LAB | Facility: HOSPITAL | Age: 70
End: 2020-10-17
Attending: INTERNAL MEDICINE
Payer: MEDICARE

## 2020-10-17 DIAGNOSIS — R07.9 CHEST PAIN: ICD-10-CM

## 2020-10-17 LAB
SARS-COV-2 RNA SPEC QL NAA+PROBE: NOT DETECTED
SPECIMEN SOURCE: NORMAL

## 2020-10-17 PROCEDURE — 36415 COLL VENOUS BLD VENIPUNCTURE: CPT

## 2020-10-17 PROCEDURE — 85027 COMPLETE CBC AUTOMATED: CPT

## 2020-10-20 ENCOUNTER — HOSPITAL ENCOUNTER (OUTPATIENT)
Dept: INTERVENTIONAL RADIOLOGY/VASCULAR | Facility: HOSPITAL | Age: 70
Discharge: HOME | End: 2020-10-20
Attending: INTERNAL MEDICINE | Admitting: INTERNAL MEDICINE
Payer: MEDICARE

## 2020-10-20 VITALS
DIASTOLIC BLOOD PRESSURE: 80 MMHG | BODY MASS INDEX: 26.41 KG/M2 | SYSTOLIC BLOOD PRESSURE: 115 MMHG | OXYGEN SATURATION: 95 % | HEIGHT: 72 IN | WEIGHT: 195 LBS | RESPIRATION RATE: 15 BRPM | TEMPERATURE: 98 F | HEART RATE: 69 BPM

## 2020-10-20 DIAGNOSIS — R07.9 CHEST PAIN: Primary | ICD-10-CM

## 2020-10-20 LAB
ATRIAL RATE: 65 BPM
P AXIS: 59 DEGREES
P-R INTERVAL: 184 MS
Q-T INTERVAL: 396 MS
QRS DURATION: 88 MS
QTC CALCULATION (BEZET): 411 MS
R AXIS: 3 DEGREES
T AXIS: 39 DEGREES
VENTRICULAR RATE: 65 BPM

## 2020-10-20 PROCEDURE — 93454 CORONARY ARTERY ANGIO S&I: CPT

## 2020-10-20 PROCEDURE — X1094 NO CHARGE VISIT: HCPCS | Performed by: INTERNAL MEDICINE

## 2020-10-20 PROCEDURE — 93010 ELECTROCARDIOGRAM REPORT: CPT | Performed by: INTERNAL MEDICINE

## 2020-10-20 PROCEDURE — 93005 ELECTROCARDIOGRAM TRACING: CPT

## 2020-10-20 PROCEDURE — B211YZZ FLUOROSCOPY OF MULTIPLE CORONARY ARTERIES USING OTHER CONTRAST: ICD-10-PCS | Performed by: INTERNAL MEDICINE

## 2020-10-20 PROCEDURE — 93454 CORONARY ARTERY ANGIO S&I: CPT | Performed by: INTERNAL MEDICINE

## 2020-10-20 RX ORDER — SODIUM CHLORIDE 9 MG/ML
INJECTION, SOLUTION INTRAVENOUS
Status: COMPLETED | OUTPATIENT
Start: 2020-10-21 | End: 2020-10-20

## 2020-10-20 RX ORDER — LIDOCAINE HYDROCHLORIDE 10 MG/ML
INJECTION, SOLUTION EPIDURAL; INFILTRATION; INTRACAUDAL; PERINEURAL
Status: COMPLETED
Start: 2020-10-20 | End: 2020-10-20

## 2020-10-20 RX ORDER — MIDAZOLAM HYDROCHLORIDE 1 MG/ML
INJECTION INTRAMUSCULAR; INTRAVENOUS
Status: COMPLETED
Start: 2020-10-20 | End: 2020-10-20

## 2020-10-20 RX ORDER — HEPARIN SODIUM 5000 [USP'U]/ML
INJECTION, SOLUTION INTRAVENOUS; SUBCUTANEOUS
Status: COMPLETED
Start: 2020-10-20 | End: 2020-10-20

## 2020-10-20 RX ORDER — ASPIRIN 81 MG/1
TABLET, CHEWABLE ORAL
Status: COMPLETED
Start: 2020-10-20 | End: 2020-10-20

## 2020-10-20 RX ORDER — ASPIRIN 81 MG/1
324 TABLET, CHEWABLE ORAL ONCE
Status: COMPLETED | OUTPATIENT
Start: 2020-10-20 | End: 2020-10-20

## 2020-10-20 RX ADMIN — SODIUM CHLORIDE: 9 INJECTION, SOLUTION INTRAVENOUS at 10:36:00

## 2020-10-20 RX ADMIN — ASPIRIN 324 MG: 81 TABLET, CHEWABLE ORAL at 10:45:00

## 2020-10-20 NOTE — PLAN OF CARE
Pt post LHC via right groin. Groin closed w/ angioseal. Site c/d/i & soft on arrival back to unit. All peripheral pulses intact & palp. VSS. Pt tolerating PO. After required bedrest & voiding, pt ambulated in hallway w/o difficulty. Groin remains intact.  P

## 2020-10-20 NOTE — H&P
History & Physical Examination    Patient Name: Lizzy Yanez  MRN: [id number]  CSN: [id number]  YOB: 1950    Diagnosis: chest pain with a hx of CAD    Present Illness: 78year old male presents to the hospital for a scheduled LHC with Dr Guevara Randall COLONOSCOPY,DIAGNOSTIC  12/30/2016    5mm proximal ascending, 2 cm distal ascending (Endoclip x 3), sigmoid polyp (adenomas), divertiuclosis   • COLONOSCOPY,MIROSLAVA ORTEGA,SNARE  12/22/10    1 cm polyp descending colon at 50cm (adenomatous)   • CYSTOSCOPY,INSER

## 2020-10-21 ENCOUNTER — APPOINTMENT (OUTPATIENT)
Dept: ULTRASOUND IMAGING | Facility: HOSPITAL | Age: 70
End: 2020-10-21
Attending: EMERGENCY MEDICINE
Payer: MEDICARE

## 2020-10-21 ENCOUNTER — TELEPHONE (OUTPATIENT)
Dept: CARDIOLOGY | Age: 70
End: 2020-10-21

## 2020-10-21 ENCOUNTER — HOSPITAL ENCOUNTER (EMERGENCY)
Facility: HOSPITAL | Age: 70
Discharge: HOME | End: 2020-10-21
Attending: EMERGENCY MEDICINE
Payer: MEDICARE

## 2020-10-21 VITALS
RESPIRATION RATE: 18 BRPM | OXYGEN SATURATION: 99 % | SYSTOLIC BLOOD PRESSURE: 123 MMHG | HEART RATE: 75 BPM | DIASTOLIC BLOOD PRESSURE: 75 MMHG

## 2020-10-21 DIAGNOSIS — R58 BLEEDING: Primary | ICD-10-CM

## 2020-10-21 PROCEDURE — 99284 EMERGENCY DEPT VISIT MOD MDM: CPT

## 2020-10-21 PROCEDURE — 80053 COMPREHEN METABOLIC PANEL: CPT | Performed by: EMERGENCY MEDICINE

## 2020-10-21 PROCEDURE — 93926 LOWER EXTREMITY STUDY: CPT | Performed by: EMERGENCY MEDICINE

## 2020-10-21 PROCEDURE — 36415 COLL VENOUS BLD VENIPUNCTURE: CPT

## 2020-10-21 PROCEDURE — 85025 COMPLETE CBC W/AUTO DIFF WBC: CPT | Performed by: EMERGENCY MEDICINE

## 2020-10-21 NOTE — ED PROVIDER NOTES
Patient Seen in: [facility] Emergency Department      History   Patient presents with:  Bleeding    Stated Complaint: Angiogram yesterday, bleeding from site, has not changed site     HPI    Patient presents with bleeding from angiogram site.   The p (adenomatous)   • CYSTOSCOPY,INSERT URETERAL STENT     • DIL URETHRA STRIC,MALE,INITIAL  remote   • OTHER SURGICAL HISTORY  10-16-10    angiogram, negative   • OTHER SURGICAL HISTORY  7/31/12    Cysto -   Ruchi   • OTHER SURGICAL HISTORY  09/12    stent ---------                               -----------         ------                     CBC W/ DIFFERENTIAL[[id number]]                              Final result                 Please view results for these tests on the individual orders.    R

## 2020-11-17 ENCOUNTER — TELEPHONE (OUTPATIENT)
Dept: CARDIOLOGY | Age: 70
End: 2020-11-17

## 2020-12-08 ENCOUNTER — HOSPITAL ENCOUNTER (INPATIENT)
Facility: HOSPITAL | Age: 70
LOS: 12 days | Discharge: HOME HEALTH | DRG: 871 | End: 2020-12-20
Attending: EMERGENCY MEDICINE | Admitting: INTERNAL MEDICINE
Payer: MEDICARE

## 2020-12-08 ENCOUNTER — APPOINTMENT (OUTPATIENT)
Dept: CT IMAGING | Facility: HOSPITAL | Age: 70
DRG: 871 | End: 2020-12-08
Attending: INTERNAL MEDICINE
Payer: MEDICARE

## 2020-12-08 ENCOUNTER — APPOINTMENT (OUTPATIENT)
Dept: GENERAL RADIOLOGY | Facility: HOSPITAL | Age: 70
DRG: 871 | End: 2020-12-08
Attending: EMERGENCY MEDICINE
Payer: MEDICARE

## 2020-12-08 DIAGNOSIS — R09.02 HYPOXIA: ICD-10-CM

## 2020-12-08 DIAGNOSIS — J12.82 PNEUMONIA DUE TO COVID-19 VIRUS: Primary | ICD-10-CM

## 2020-12-08 DIAGNOSIS — U07.1 PNEUMONIA DUE TO COVID-19 VIRUS: Primary | ICD-10-CM

## 2020-12-08 PROCEDURE — 93005 ELECTROCARDIOGRAM TRACING: CPT

## 2020-12-08 PROCEDURE — 5A0955Z ASSISTANCE WITH RESPIRATORY VENTILATION, GREATER THAN 96 CONSECUTIVE HOURS: ICD-10-PCS | Performed by: INTERNAL MEDICINE

## 2020-12-08 PROCEDURE — 83615 LACTATE (LD) (LDH) ENZYME: CPT | Performed by: EMERGENCY MEDICINE

## 2020-12-08 PROCEDURE — 84145 PROCALCITONIN (PCT): CPT | Performed by: EMERGENCY MEDICINE

## 2020-12-08 PROCEDURE — 85379 FIBRIN DEGRADATION QUANT: CPT | Performed by: EMERGENCY MEDICINE

## 2020-12-08 PROCEDURE — 93010 ELECTROCARDIOGRAM REPORT: CPT

## 2020-12-08 PROCEDURE — 3E0333Z INTRODUCTION OF ANTI-INFLAMMATORY INTO PERIPHERAL VEIN, PERCUTANEOUS APPROACH: ICD-10-PCS | Performed by: INTERNAL MEDICINE

## 2020-12-08 PROCEDURE — 99285 EMERGENCY DEPT VISIT HI MDM: CPT

## 2020-12-08 PROCEDURE — 83605 ASSAY OF LACTIC ACID: CPT | Performed by: EMERGENCY MEDICINE

## 2020-12-08 PROCEDURE — 82550 ASSAY OF CK (CPK): CPT | Performed by: EMERGENCY MEDICINE

## 2020-12-08 PROCEDURE — 96361 HYDRATE IV INFUSION ADD-ON: CPT

## 2020-12-08 PROCEDURE — 71045 X-RAY EXAM CHEST 1 VIEW: CPT | Performed by: EMERGENCY MEDICINE

## 2020-12-08 PROCEDURE — 85025 COMPLETE CBC W/AUTO DIFF WBC: CPT | Performed by: EMERGENCY MEDICINE

## 2020-12-08 PROCEDURE — 82728 ASSAY OF FERRITIN: CPT | Performed by: EMERGENCY MEDICINE

## 2020-12-08 PROCEDURE — 83880 ASSAY OF NATRIURETIC PEPTIDE: CPT | Performed by: EMERGENCY MEDICINE

## 2020-12-08 PROCEDURE — 36415 COLL VENOUS BLD VENIPUNCTURE: CPT

## 2020-12-08 PROCEDURE — 81003 URINALYSIS AUTO W/O SCOPE: CPT | Performed by: EMERGENCY MEDICINE

## 2020-12-08 PROCEDURE — 96374 THER/PROPH/DIAG INJ IV PUSH: CPT

## 2020-12-08 PROCEDURE — 86140 C-REACTIVE PROTEIN: CPT | Performed by: EMERGENCY MEDICINE

## 2020-12-08 PROCEDURE — 84484 ASSAY OF TROPONIN QUANT: CPT | Performed by: EMERGENCY MEDICINE

## 2020-12-08 PROCEDURE — 71275 CT ANGIOGRAPHY CHEST: CPT | Performed by: INTERNAL MEDICINE

## 2020-12-08 PROCEDURE — 80053 COMPREHEN METABOLIC PANEL: CPT | Performed by: EMERGENCY MEDICINE

## 2020-12-08 PROCEDURE — 87040 BLOOD CULTURE FOR BACTERIA: CPT | Performed by: EMERGENCY MEDICINE

## 2020-12-08 RX ORDER — ALPRAZOLAM 0.5 MG/1
1 TABLET ORAL 3 TIMES DAILY
Status: DISCONTINUED | OUTPATIENT
Start: 2020-12-08 | End: 2020-12-20

## 2020-12-08 RX ORDER — ROSUVASTATIN CALCIUM 20 MG/1
20 TABLET, COATED ORAL
Status: DISCONTINUED | OUTPATIENT
Start: 2020-12-08 | End: 2020-12-20

## 2020-12-08 RX ORDER — ACETAMINOPHEN 325 MG/1
650 TABLET ORAL EVERY 6 HOURS PRN
Status: DISCONTINUED | OUTPATIENT
Start: 2020-12-08 | End: 2020-12-20

## 2020-12-08 RX ORDER — METOCLOPRAMIDE HYDROCHLORIDE 5 MG/ML
10 INJECTION INTRAMUSCULAR; INTRAVENOUS EVERY 8 HOURS PRN
Status: DISCONTINUED | OUTPATIENT
Start: 2020-12-08 | End: 2020-12-20

## 2020-12-08 RX ORDER — FLUOXETINE HYDROCHLORIDE 20 MG/1
20 CAPSULE ORAL DAILY
Status: DISCONTINUED | OUTPATIENT
Start: 2020-12-08 | End: 2020-12-20

## 2020-12-08 RX ORDER — CLOPIDOGREL BISULFATE 75 MG/1
75 TABLET ORAL DAILY
Status: DISCONTINUED | OUTPATIENT
Start: 2020-12-08 | End: 2020-12-20

## 2020-12-08 RX ORDER — ACETAMINOPHEN 500 MG
1000 TABLET ORAL ONCE
Status: COMPLETED | OUTPATIENT
Start: 2020-12-08 | End: 2020-12-08

## 2020-12-08 RX ORDER — DEXAMETHASONE SODIUM PHOSPHATE 4 MG/ML
6 VIAL (ML) INJECTION ONCE
Status: COMPLETED | OUTPATIENT
Start: 2020-12-08 | End: 2020-12-08

## 2020-12-08 RX ORDER — ENOXAPARIN SODIUM 100 MG/ML
40 INJECTION SUBCUTANEOUS DAILY
Status: DISCONTINUED | OUTPATIENT
Start: 2020-12-08 | End: 2020-12-20

## 2020-12-08 RX ORDER — DEXAMETHASONE SODIUM PHOSPHATE 4 MG/ML
6 VIAL (ML) INJECTION DAILY
Status: DISCONTINUED | OUTPATIENT
Start: 2020-12-08 | End: 2020-12-11

## 2020-12-08 RX ORDER — ACETAMINOPHEN 500 MG
1000 TABLET ORAL EVERY 6 HOURS PRN
COMMUNITY

## 2020-12-08 RX ORDER — SODIUM CHLORIDE 9 MG/ML
INJECTION, SOLUTION INTRAVENOUS CONTINUOUS
Status: DISCONTINUED | OUTPATIENT
Start: 2020-12-08 | End: 2020-12-09

## 2020-12-08 RX ORDER — ONDANSETRON 2 MG/ML
4 INJECTION INTRAMUSCULAR; INTRAVENOUS EVERY 6 HOURS PRN
Status: DISCONTINUED | OUTPATIENT
Start: 2020-12-08 | End: 2020-12-20

## 2020-12-08 NOTE — ED PROVIDER NOTES
Patient Seen in: [facility] Emergency Department      History   Patient presents with:  Covid  Difficulty Breathing    Stated Complaint: sob    HPI    Patient is a pleasant 21-year-old male with history of hypertension, CAD, Covid + 11 days ago pres • OTHER SURGICAL HISTORY  7/31/12    Cysto - Dr.J. Hopper   • OTHER SURGICAL HISTORY  09/12    stent                    Social History    Tobacco Use      Smoking status: Former Smoker        Packs/day: 1.50        Years: 30.00        Pack years: 39 Mental Status: He is alert and oriented to person, place, and time.              ED Course     Labs Reviewed   PRO BETA NATRIURETIC PEPTIDE - Abnormal; Notable for the following components:       Result Value    Pro-Beta Natriuretic Peptide 817 (*)     All Portable  (cpt=71045)    Result Date: 12/8/2020  PROCEDURE:  XR CHEST AP PORTABLE  (CPT=71045)  TECHNIQUE:  AP chest radiograph was obtained.   COMPARISON:  [facility] , XR, XR CHEST PA + LAT CHEST (CPT=71046), 6/11/2019, 12:31 PM.  [facility] , XR, XR CHEST PA + L Update at 9:30 AM.  Some labs are still pending. No leukocytosis, normal electrolytes and renal function. Chest x-ray does demonstrate scattered opacities consistent with Covid pneumonia.   He is 93 to 94% on 5 L nasal cannula and feels comfortable,

## 2020-12-08 NOTE — H&P
[facility] Hospitalist H&P       CC: Patient presents with:  Covid  Difficulty Breathing       PCP: Kathy Martinez MD    History of Present Illness:  Mr. Abhishek Loera is a 79 yo male with PMH of CAD, generalized anxiety disorder, HTN, HLD who presented to the ED with wo 12/22/10    1 cm polyp descending colon at 50cm (adenomatous)   • CYSTOSCOPY,INSERT URETERAL STENT     • DIL URETHRA STRIC,MALE,INITIAL  remote   • OTHER SURGICAL HISTORY  10-16-10    angiogram, negative   • OTHER SURGICAL HISTORY  7/31/12    Cysto -  BP Readings from Last 3 Encounters:  12/08/20 : 124/70  10/21/20 : 123/75  10/20/20 : 115/80    Wt Readings from Last 3 Encounters:  12/08/20 : 195 lb 1.7 oz (88.5 kg)  10/15/20 : 195 lb (88.5 kg)  10/05/20 : 195 lb 3.2 oz (88.5 kg)      Wt Readings fr pneumonia. Heart size is within normal limits. No pleural effusion is seen. Mediastinal and hilar contours are normal.            CONCLUSION:  Some patchy airspace opacities are present within the lungs suspicious for areas of pneumonia.   Viral pneumoni discharge, patient will require TBD.

## 2020-12-08 NOTE — ED INITIAL ASSESSMENT (HPI)
Dx withcovid 2 weeks ago, states has some days that are worse with sob and fever,  Wife made pt come in today   Also weakness

## 2020-12-08 NOTE — CONSULTS
INFECTIOUS DISEASE [address] [facility] Patient Status:  Inpatient    1950 MRN [id number]   [facility] 3NW-A Attending Rosemarie Feng MD   Hosp Day # 0 ARMANDO Mccann 7/31/12    Cysto - Dr.J. Hopper   • OTHER SURGICAL HISTORY  09/12    stent     Family History   Problem Relation Age of Onset   • Heart Disorder Father    • Heart Disease Father    • Cancer Father    • Other (Other) Father    • Other (Other) Mother         CV •  acetaminophen 500 MG Oral Tab, Take 1,000 mg by mouth every 6 (six) hours as needed for Pain., Disp: , Rfl:     •  FLUOXETINE HCL 20 MG Oral Cap, TAKE ONE CAPSULE BY MOUTH ONE TIME DAILY, Disp: 90 capsule, Rfl: 0    •  ROSUVASTATIN CALCIUM 20 MG Ora Not Detected 07/14/2020       Pro-Calcitonin  Recent Labs   Lab 12/08/20  0848   PCT 0.71*       Cardiac  Recent Labs   Lab 12/08/20  0848 12/08/20  1023 12/08/20  1421   TROP <0.045 <0.045 <0.045   PBNP 817*  --   --        Creatinine Kinase  Recent Labs

## 2020-12-09 ENCOUNTER — APPOINTMENT (OUTPATIENT)
Dept: ULTRASOUND IMAGING | Facility: HOSPITAL | Age: 70
DRG: 871 | End: 2020-12-09
Attending: INTERNAL MEDICINE
Payer: MEDICARE

## 2020-12-09 ENCOUNTER — APPOINTMENT (OUTPATIENT)
Dept: CV DIAGNOSTICS | Facility: HOSPITAL | Age: 70
DRG: 871 | End: 2020-12-09
Attending: INTERNAL MEDICINE
Payer: MEDICARE

## 2020-12-09 PROCEDURE — 93970 EXTREMITY STUDY: CPT | Performed by: INTERNAL MEDICINE

## 2020-12-09 PROCEDURE — 80053 COMPREHEN METABOLIC PANEL: CPT | Performed by: INTERNAL MEDICINE

## 2020-12-09 PROCEDURE — 93306 TTE W/DOPPLER COMPLETE: CPT | Performed by: INTERNAL MEDICINE

## 2020-12-09 PROCEDURE — 86255 FLUORESCENT ANTIBODY SCREEN: CPT | Performed by: INTERNAL MEDICINE

## 2020-12-09 PROCEDURE — 83050 HGB METHEMOGLOBIN QUAN: CPT | Performed by: INTERNAL MEDICINE

## 2020-12-09 PROCEDURE — 82962 GLUCOSE BLOOD TEST: CPT

## 2020-12-09 PROCEDURE — 83615 LACTATE (LD) (LDH) ENZYME: CPT | Performed by: INTERNAL MEDICINE

## 2020-12-09 PROCEDURE — XW043H5 INTRODUCTION OF TOCILIZUMAB INTO CENTRAL VEIN, PERCUTANEOUS APPROACH, NEW TECHNOLOGY GROUP 5: ICD-10-PCS | Performed by: INTERNAL MEDICINE

## 2020-12-09 PROCEDURE — 83519 RIA NONANTIBODY: CPT | Performed by: INTERNAL MEDICINE

## 2020-12-09 PROCEDURE — 86431 RHEUMATOID FACTOR QUANT: CPT | Performed by: INTERNAL MEDICINE

## 2020-12-09 PROCEDURE — 83516 IMMUNOASSAY NONANTIBODY: CPT | Performed by: INTERNAL MEDICINE

## 2020-12-09 PROCEDURE — 82728 ASSAY OF FERRITIN: CPT | Performed by: INTERNAL MEDICINE

## 2020-12-09 PROCEDURE — 86140 C-REACTIVE PROTEIN: CPT | Performed by: INTERNAL MEDICINE

## 2020-12-09 PROCEDURE — 82803 BLOOD GASES ANY COMBINATION: CPT | Performed by: INTERNAL MEDICINE

## 2020-12-09 PROCEDURE — 84238 ASSAY NONENDOCRINE RECEPTOR: CPT | Performed by: INTERNAL MEDICINE

## 2020-12-09 PROCEDURE — 83876 ASSAY MYELOPEROXIDASE: CPT | Performed by: INTERNAL MEDICINE

## 2020-12-09 PROCEDURE — 85018 HEMOGLOBIN: CPT | Performed by: INTERNAL MEDICINE

## 2020-12-09 PROCEDURE — 85379 FIBRIN DEGRADATION QUANT: CPT | Performed by: INTERNAL MEDICINE

## 2020-12-09 PROCEDURE — 82375 ASSAY CARBOXYHB QUANT: CPT | Performed by: INTERNAL MEDICINE

## 2020-12-09 PROCEDURE — 85025 COMPLETE CBC W/AUTO DIFF WBC: CPT | Performed by: INTERNAL MEDICINE

## 2020-12-09 PROCEDURE — 36600 WITHDRAWAL OF ARTERIAL BLOOD: CPT | Performed by: INTERNAL MEDICINE

## 2020-12-09 PROCEDURE — 83605 ASSAY OF LACTIC ACID: CPT | Performed by: INTERNAL MEDICINE

## 2020-12-09 PROCEDURE — 86038 ANTINUCLEAR ANTIBODIES: CPT | Performed by: INTERNAL MEDICINE

## 2020-12-09 PROCEDURE — 82330 ASSAY OF CALCIUM: CPT | Performed by: INTERNAL MEDICINE

## 2020-12-09 PROCEDURE — 84295 ASSAY OF SERUM SODIUM: CPT | Performed by: INTERNAL MEDICINE

## 2020-12-09 PROCEDURE — 84132 ASSAY OF SERUM POTASSIUM: CPT | Performed by: INTERNAL MEDICINE

## 2020-12-09 RX ORDER — NICOTINE 21 MG/24HR
1 PATCH, TRANSDERMAL 24 HOURS TRANSDERMAL DAILY
Status: DISCONTINUED | OUTPATIENT
Start: 2020-12-09 | End: 2020-12-20

## 2020-12-09 NOTE — COVID NURSING ASSESSMENT
\"  COVID-19 Daily Discharge Readiness-Nursing    O2 Sat at Rest:   88  %   O2 Sat with Exertion:  90  % on   2.5 liters   Temperature max from last 24 hrs: Temp (24hrs), Av.8 °F (37.7 °C), Min:98.5 °F (36.9 °C), Max:102.9 °F (39.4 °C)    Inflammatory

## 2020-12-09 NOTE — PROGRESS NOTES
[facility]                INFECTIOUS DISEASE PROGRESS NOTE    Karren Halsted Patient Status:  Inpatient    1950 MRN [id number]   [facility] 3NW-A Attending Flex Scott MD   Hosp Day # 1 PCP Marcie Mcdonnell MD     Antibi 49*   ALT 25 35   BILT 0.3 0.3   TP 6.7 6.3*       No results found for: [facility] Encounter on 12/08/20   1.  BLOOD CULTURE     Status: None (Preliminary result)    Collection Time: 12/08/20  8:48 AM    Specimen: Blood,peripheral   Re

## 2020-12-09 NOTE — CONSULTS
Pulmonary H&P/Consult     NAME: Charles Sheppard - ROOM: [address] [place] [address] - MRN: [id number] - Age: 78year old - :  1950    Date of Admission: 2020  8:33 AM  Admission Diagnosis: Hypoxia [R09.02]  Pneumonia due to COVID-19 virus [U07.1, J12.89]    Assessm months ago but testing was negative. He had more headaches and repeat COVID testing in Nov 27 was positive. No dyspnea - well maybe just slight. But main reason he came was for weakness and headaches.   Needing preogressively more O2    Past Medical Hist Hashimoto's   Social History    Tobacco Use      Smoking status: Former Smoker        Packs/day: 1.50        Years: 30.00        Pack years: 45        Types: Cigarettes        Quit date: 2010        Years since quitting: 10.9      Smokeless tobacco: Never 8.2*   ALB 2.4* 2.2*    137   K 4.2 4.3    105   CO2 28.0 28.0   ALKPHO 55 50   AST 33 49*   ALT 25 35   BILT 0.3 0.3   TP 6.7 6.3*     No results for input(s): BNP in the last 168 hours.   Recent Labs   Lab 12/08/20  0848 12/08/20  1023 12/08/2

## 2020-12-09 NOTE — COVID NURSING ASSESSMENT
COVID-19 Daily Discharge Readiness-Nursing    O2 Sat at Rest:     %   O2 Sat with Exertion:    % on    liters   Temperature max from last 24 hrs: Temp (24hrs), Av.1 °F (36.7 °C), Min:97.5 °F (36.4 °C), Max:98.7 °F (37.1 °C)    Inflammatory Markers:   R

## 2020-12-09 NOTE — PLAN OF CARE
Patient took off telemetry and oxygen. Placed back on 30L 100%, oxygen sats 70's. Had patient prone immediately, took several minutes to reach 80%. RT called. Patient very confused. Oxygen sats 90%, RT increased Vapotherm to 40% 100%.  RT did bring bipap if

## 2020-12-09 NOTE — RESPIRATORY THERAPY NOTE
Called by RN due to patient desating. Patient's sats have been high 80's on 10L NRB mask. Patient did try proning but unable to tolerate for long. With exertion patients sats drop to 80%. Patient started on vapotherm 40L/100%. Will wean down as able.

## 2020-12-09 NOTE — COVID NURSING ASSESSMENT
COVID-19 Daily Discharge Readiness-Nursing    O2 Sat at Rest:     93%  On VT 35L 100%  O2 Sat with Exertion:   80 % on   10 liters hfnc  Temperature max from last 24 hrs: Temp (24hrs), Av.1 °F (37.3 °C), Min:97.5 °F (36.4 °C), Max:102.9 °F (39.4 °C)

## 2020-12-09 NOTE — PROGRESS NOTES
[facility] Hospitalist Progress Note     [facility]      SUBJECTIVE:  Patient desaturating overnight  On vapotherm this AM  Afebrile overnight    OBJECTIVE:  Temp:  [97.5 °F (36.4 °C)-98.7 °F (37.1 °C)] 97.8 °F (36.6 °C)  Pulse:  [68-85] 68  Resp:  [18-26] ACR (American College of Radiology) NRDR ([address]) which includes the Dose Index Registry. PATIENT STATED HISTORY:(As transcribed by Technologist)  Patient states he has been diagnosed of covid for 3 weeks and is getting worse.  Co by Technologist)  He was diagnosed  with covid 2 weeks ago, states has some days that are worse with shortness of breath, weakness and  fever. FINDINGS:  There are some subtle patchy airspace opacities within the lungs suspicious for areas of pneumonia. worsening weakness and fatigue.     # Acute Hypoxemic Respiratory Failure  # COVID 19 PNA  - given  - IV steroids started in ER  - ID c/s -- appreciate recs, discussed with Dr. Halima Boyd this morning -- given time course CCP likely not to be of benefit, but f

## 2020-12-09 NOTE — PLAN OF CARE
Problem: RESPIRATORY - ADULT  Goal: Achieves optimal ventilation and oxygenation  Description: INTERVENTIONS:  - Assess for changes in respiratory status  - Assess for changes in mentation and behavior  - Position to facilitate oxygenation and minimize r physical limitations  - Instruct pt to call for assistance with activity based on assessment  - Modify environment to reduce risk of injury  - Provide assistive devices as appropriate  - Consider OT/PT consult to assist with strengthening/mobility  - Encou

## 2020-12-10 PROCEDURE — 85025 COMPLETE CBC W/AUTO DIFF WBC: CPT | Performed by: INTERNAL MEDICINE

## 2020-12-10 PROCEDURE — 82728 ASSAY OF FERRITIN: CPT | Performed by: INTERNAL MEDICINE

## 2020-12-10 PROCEDURE — 85379 FIBRIN DEGRADATION QUANT: CPT | Performed by: INTERNAL MEDICINE

## 2020-12-10 PROCEDURE — 93010 ELECTROCARDIOGRAM REPORT: CPT | Performed by: INTERNAL MEDICINE

## 2020-12-10 PROCEDURE — 84484 ASSAY OF TROPONIN QUANT: CPT | Performed by: HOSPITALIST

## 2020-12-10 PROCEDURE — 83615 LACTATE (LD) (LDH) ENZYME: CPT | Performed by: INTERNAL MEDICINE

## 2020-12-10 PROCEDURE — 93005 ELECTROCARDIOGRAM TRACING: CPT

## 2020-12-10 PROCEDURE — 86140 C-REACTIVE PROTEIN: CPT | Performed by: INTERNAL MEDICINE

## 2020-12-10 NOTE — PROGRESS NOTES
[facility] Hospitalist Progress Note     [facility]    Cc: follow up    SUBJECTIVE:  Patient asleep and proning. On 40 L/min vapotherm, 70% FIO2. D/w RN Minh, no events overnight.     OBJECTIVE:  Temp:  [97.3 °F (36.3 °C)-98.7 °F (37.1 °C)] 98 °F (36.7 ° 12/09/20  0708 12/10/20  0733   CRP 25.80* 18.30* 8.37*   VERNELL 429.2 540.8* 642.5*   * 334* 351*   DDIMER 1.63* 1.11* 1.68*         Meds:     •  FLUoxetine HCl 20 MG Oral Cap, Take 1 capsule (20 mg total) by mouth daily.           •  Meropenem (MERREM US dopplers without PE and DVTs respectively    # Hx CAD  # HLD  - continue home plavix, statin     # Generalized anxiety disorder  - home meds     Prophy:  DVT: lovenox     Dispo: pending     d/w VEE Wilkinson    Thank You,  Juan Andrade MD    [facility]

## 2020-12-10 NOTE — PROGRESS NOTES
[facility]                INFECTIOUS DISEASE PROGRESS NOTE    Feliberto Farmer Patient Status:  Inpatient    1950 MRN [id number]   [facility] 3NW-A Attending Rosemarie Feng MD   Hosp Day # 2 PCP Rosy Mccann MD     Antibi Blood,peripheral   Result Value Ref Range    Blood Culture Result No Growth 2 Days N/A           Problem list reviewed:  Patient Active Problem List:     HTN (hypertension)     Hyperlipidemia     Coronary artery disease of native artery of native heart wit

## 2020-12-10 NOTE — COVID NURSING ASSESSMENT
COVID-19 Daily Discharge Readiness-Nursing    O2 Sat at Rest:   91% on 40L/80%  O2 Sat with Exertion:    69% on  40L/80   Temperature max from last 24 hrs: Temp (24hrs), Av.9 °F (36.6 °C), Min:97.5 °F (36.4 °C), Max:98.2 °F (36.8 °C)    Inflammatory Ma

## 2020-12-10 NOTE — PROGRESS NOTES
Pulmonary Progress Note      NAME: Dafne Fowler - ROOM: 3[facility] - MRN: [id number] - Age: 78year old - : 1950    Assessment/Plan:  1. Acute hypoxic respiratory failure. IDue to Johnny.   CT with extensive GGO infiltrates compared to recent baseline tongue normal.  No thrush noted. Neck: supple without mass   Lungs: egophony at bases   Chest wall: No tenderness or deformity. Heart: Regular rate and rhythm, normal S1S2, no murmur. Abdomen: soft, non-tender, non-distended, positive BS.    Extremity

## 2020-12-10 NOTE — PLAN OF CARE
COVID-19 Daily Discharge Readiness-Nursing    Pt was at 40L at 80% vapotherm at beginning of shift, weaned down to 70% however pt was then desating again and had to increase to 90%. Will try to wean again.    Temperature max from last 24 hrs: Temp (24hrs), Encourage broncho-pulmonary hygiene including cough, deep breathe, Incentive Spirometry  - Assess the need for suctioning and perform as needed  - Assess and instruct to report SOB or any respiratory difficulty  - Respiratory Therapy support as indicated Gretchen Elizalde RN  Outcome: Progressing

## 2020-12-11 PROCEDURE — 82728 ASSAY OF FERRITIN: CPT | Performed by: INTERNAL MEDICINE

## 2020-12-11 PROCEDURE — 84484 ASSAY OF TROPONIN QUANT: CPT | Performed by: HOSPITALIST

## 2020-12-11 PROCEDURE — 85025 COMPLETE CBC W/AUTO DIFF WBC: CPT | Performed by: INTERNAL MEDICINE

## 2020-12-11 PROCEDURE — 86140 C-REACTIVE PROTEIN: CPT | Performed by: INTERNAL MEDICINE

## 2020-12-11 PROCEDURE — 85379 FIBRIN DEGRADATION QUANT: CPT | Performed by: INTERNAL MEDICINE

## 2020-12-11 PROCEDURE — 83615 LACTATE (LD) (LDH) ENZYME: CPT | Performed by: INTERNAL MEDICINE

## 2020-12-11 RX ORDER — ECHINACEA PURPUREA EXTRACT 125 MG
2 TABLET ORAL
Status: DISCONTINUED | OUTPATIENT
Start: 2020-12-11 | End: 2020-12-20

## 2020-12-11 RX ORDER — DEXAMETHASONE SODIUM PHOSPHATE 4 MG/ML
6 VIAL (ML) INJECTION EVERY 12 HOURS
Status: DISCONTINUED | OUTPATIENT
Start: 2020-12-11 | End: 2020-12-16

## 2020-12-11 NOTE — PLAN OF CARE
COVID-19 Daily Discharge Readiness-Nursing  Vapotherm 40L 90%  Temperature max from last 24 hrs: Temp (24hrs), Av °F (36.7 °C), Min:97.2 °F (36.2 °C), Max:98.7 °F (37.1 °C)  Problem: +COVID      Data: pt is A&Ox4.  However a bit forgetful and confu respiratory difficulty  - Respiratory Therapy support as indicated  - Manage/alleviate anxiety  - Monitor for signs/symptoms of CO2 retention  Outcome: Progressing     Problem: Patient/Family Goals  Goal: Patient/Family Long Term Goal  Description: Patient

## 2020-12-11 NOTE — PROGRESS NOTES
[address] [facility] Patient Status:  Inpatient    1950 MRN [id number]   [facility] 3NW-A Attending Jerry Mayfield, *   Hosp Day # 3 PCP Aleksey Lou MD     Pulm / Critical Care Progress Note     S: pt states that h 4.2 4.3    105   CO2 28.0 28.0   BUN 14 17     Creatinine, Serum (mg/dL)   Date Value   09/15/2015 1.30 (H)   05/14/2015 1.04   02/23/2015 1.05     Creatinine (mg/dL)   Date Value   12/09/2020 0.87   12/08/2020 1.01   10/21/2020 1.16   04/24/2020 1.3

## 2020-12-11 NOTE — PROGRESS NOTES
Addendum: updated pt's son who expressed appreciation and understanding  [facility] Hospitalist Progress Note     [facility]    Cc: follow up    SUBJECTIVE:  Patient seen on video to preserve PPE. Pt asleep.  On 40 L/min vapotherm, 100% FIO2 per discussion w 12/10/20  2232 12/11/20  0023   TROP <0.045   < > <0.045 <0.045 <0.045   PBNP 817*  --   --   --   --     < > = values in this interval not displayed.        Creatinine Kinase  Recent Labs   Lab 12/08/20  0848   CK 65       Inflammatory Markers  Recent Labs NGTD   -echo with nl EF, PAP 30 mm Hg  -had complained of cp while proning yesterday, none currently per RN- EKG NSR, serial trops unremarkable. Echo noted above.   Suspect was musculoskeletal in origin given was positional (proning)- monitor closely    # W

## 2020-12-11 NOTE — PROGRESS NOTES
[facility]                INFECTIOUS DISEASE PROGRESS NOTE    Lizabeth Gallo Patient Status:  Inpatient    1950 MRN [id number]   [facility] 3NW-A Attending Mary Duffy MD   Hosp Day # 3 PCP Rodrigo Gerardo MD     Antibi 12/08/20   1.  BLOOD CULTURE     Status: None (Preliminary result)    Collection Time: 12/08/20  9:09 AM    Specimen: Blood,peripheral   Result Value Ref Range    Blood Culture Result No Growth 3 Days N/A           Problem list reviewed:  Patient Active Pro

## 2020-12-12 PROCEDURE — 85379 FIBRIN DEGRADATION QUANT: CPT | Performed by: INTERNAL MEDICINE

## 2020-12-12 PROCEDURE — 83735 ASSAY OF MAGNESIUM: CPT | Performed by: INTERNAL MEDICINE

## 2020-12-12 PROCEDURE — 83615 LACTATE (LD) (LDH) ENZYME: CPT | Performed by: INTERNAL MEDICINE

## 2020-12-12 PROCEDURE — 80048 BASIC METABOLIC PNL TOTAL CA: CPT | Performed by: INTERNAL MEDICINE

## 2020-12-12 PROCEDURE — 86140 C-REACTIVE PROTEIN: CPT | Performed by: INTERNAL MEDICINE

## 2020-12-12 PROCEDURE — 84100 ASSAY OF PHOSPHORUS: CPT | Performed by: INTERNAL MEDICINE

## 2020-12-12 PROCEDURE — 85025 COMPLETE CBC W/AUTO DIFF WBC: CPT | Performed by: INTERNAL MEDICINE

## 2020-12-12 PROCEDURE — 82728 ASSAY OF FERRITIN: CPT | Performed by: INTERNAL MEDICINE

## 2020-12-12 NOTE — COVID NURSING ASSESSMENT
COVID-19 Daily Discharge Readiness-Nursing    O2 Sat at Rest:    96 % on Vapotherm 40L, 90% FiO2  Temperature max from last 24 hrs: Temp (24hrs), Av.7 °F (36.5 °C), Min:97.6 °F (36.4 °C), Max:98 °F (36.7 °C)    Inflammatory Markers:   Recent Labs   Lab

## 2020-12-12 NOTE — RESPIRATORY THERAPY NOTE
ASKED TO SEE PATIENT. RT BUSY. PATIENT ON 95% AND 40LPM  SAO2 87-89% SITTING UP IN BED. TOLD PATIENT HIS SAO2 IS DECREASING AND NEEDS TO PRONE. INCREASED % WITH NO CHANGE. ASKED PATIENT TO PRONE.  SAO2 DECREASED TO 83% DURING TURNING.   SLOWLY CA

## 2020-12-12 NOTE — PROGRESS NOTES
[facility] Hospitalist Progress Note     [facility]    Cc: follow up    SUBJECTIVE:  Patient laying in bed, proned. Asleep. On 40L/100% FIO2. D/w RN- says pt desats when talking on phone or moving.     OBJECTIVE:  Temp:  [97.6 °F (36.4 °C)-98 °F (36.7 12/08/20  0848   CK 65       Inflammatory Markers  Recent Labs   Lab 12/09/20  0708 12/10/20  0733 12/11/20  0717   CRP 18.30* 8.37* 3.29*   VERNELL 540.8* 642.5* 495.2   * 351* 375*   DDIMER 1.11* 1.68* 1.45*         Meds:     •  FLUoxetine HCl 20 MG O Suspect was musculoskeletal in origin given was positional (proning)- monitor closely    # Weakness  - per previous hospitalist discussion with Dr. Juan F Dumont some concern for progressive weakness prior to all of this  - pending autoimmune studies     # Sep

## 2020-12-12 NOTE — PROGRESS NOTES
[facility],2Nd Floor Patient Status:  Inpatient    1950 MRN [id number]   [facility] 3NW-A Attending Serg Byrd, *   Hosp Day # 4 PCP Jeremias Thomas MD     Critical Care Progress Note     Date of Admission: 20 [Urine:300]     General appearance: alert, appears stated age, cooperative and no distress  Lungs: diminished breath sounds bilaterally  Heart: regular rate and rhythm  Abdomen: soft, non-tender; bowel sounds normal; no masses,  no organomegaly  Extremitie elevated PA pressures  -on empiric abx with meropenem  -WALLY, ANCA negative;  RF normal  3. Hx of smoking - quit 10 yrs ago - no signs of emphysema on baseline CT  4. CAD - patient of Carolina Velasquez cath  Echo looks good  5.  Dispo - Full code  -critically

## 2020-12-12 NOTE — COVID NURSING ASSESSMENT
COVID-19 Daily Discharge Readiness-Nursing    O2 Sat at Rest:    90 %   O2 Sat with Exertion:   Vapotherm 40L/85%  Temperature max from last 24 hrs: Temp (24hrs), Av.8 °F (36.6 °C), Min:97.6 °F (36.4 °C), Max:98.2 °F (36.8 °C)    Inflammatory Markers:

## 2020-12-13 PROCEDURE — 85027 COMPLETE CBC AUTOMATED: CPT | Performed by: HOSPITALIST

## 2020-12-13 PROCEDURE — 82728 ASSAY OF FERRITIN: CPT | Performed by: HOSPITALIST

## 2020-12-13 PROCEDURE — 83735 ASSAY OF MAGNESIUM: CPT | Performed by: HOSPITALIST

## 2020-12-13 PROCEDURE — 80053 COMPREHEN METABOLIC PANEL: CPT | Performed by: HOSPITALIST

## 2020-12-13 PROCEDURE — 83615 LACTATE (LD) (LDH) ENZYME: CPT | Performed by: HOSPITALIST

## 2020-12-13 PROCEDURE — 86140 C-REACTIVE PROTEIN: CPT | Performed by: HOSPITALIST

## 2020-12-13 PROCEDURE — 85379 FIBRIN DEGRADATION QUANT: CPT | Performed by: HOSPITALIST

## 2020-12-13 RX ORDER — BACLOFEN 5 MG/1
5 TABLET ORAL 2 TIMES DAILY PRN
Status: DISCONTINUED | OUTPATIENT
Start: 2020-12-13 | End: 2020-12-20

## 2020-12-13 NOTE — COVID NURSING ASSESSMENT
COVID-19 Daily Discharge Readiness-Nursing    O2 Sat at Rest: 92    %   O2 Sat with Exertion:  40L/50% Vapotherm  Temperature max from last 24 hrs: Temp (24hrs), Av.9 °F (36.6 °C), Min:97.2 °F (36.2 °C), Max:98.5 °F (36.9 °C)    Inflammatory Markers:

## 2020-12-13 NOTE — COVID NURSING ASSESSMENT
COVID-19 Daily Discharge Readiness-Nursing    O2 Sat at Rest:    93-96 % on Vapotherm 40L 80%  Temperature max from last 24 hrs: Temp (24hrs), Av.1 °F (36.7 °C), Min:97.6 °F (36.4 °C), Max:98.5 °F (36.9 °C)    Inflammatory Markers:   Recent Labs   Lab

## 2020-12-13 NOTE — PROGRESS NOTES
Pulmonary Progress Note        NAME: Lois Pillai - ROOM: 608/685-O - MRN: [id number] - Age: 78year old - : 1950    Past Medical History:   Diagnosis Date   • Anxiety     hx/o panic attacks   • Back problem    • CORONARY ARTERY DISEASE    • Coronar RBC 3.78* 3.49* 4.06 4.07   HGB 12.1* 11.3* 12.9* 12.9*   HCT 35.3* 33.3* 37.5* 36.8*   MCV 93.4 95.4 92.4 90.4   MCH 32.0 32.4 31.8 31.7   MCHC 34.3 33.9 34.4 35.1   RDW 11.9 11.8 11.7 11.9   NEPRELIM 7.30 8.21* 9.76*  --    WBC 8.3 9.7 11.4* 14.5*   PL

## 2020-12-13 NOTE — PROGRESS NOTES
[facility] Hospitalist Progress Note     [facility]    Cc: follow up    SUBJECTIVE:  Patient laying in bed asleep. Holding cup while asleep  On 40L/75% FIO2.  D/w RN- pt easily arousable  OBJECTIVE:  Temp:  [97.4 °F (36.3 °C)-98.5 °F (36.9 °C)] 97.4 °F (36 <0.045 <0.045   PBNP 817*  --   --   --   --     < > = values in this interval not displayed.        Creatinine Kinase  Recent Labs   Lab 12/08/20  0848   CK 65       Inflammatory Markers  Recent Labs   Lab 12/10/20  0733 12/11/20  0717 12/12/20  1134   CRP bilateral ground glass opacities  - blood cultures NGTD   -echo with nl EF, PAP 30 mm Hg  -had complained of cp while proning  12/10, none currently per RN- EKG NSR, serial trops unremarkable. Echo noted above.   Suspect was musculoskeletal in origin given

## 2020-12-14 PROCEDURE — 85027 COMPLETE CBC AUTOMATED: CPT | Performed by: HOSPITALIST

## 2020-12-14 PROCEDURE — 85379 FIBRIN DEGRADATION QUANT: CPT | Performed by: HOSPITALIST

## 2020-12-14 PROCEDURE — 83615 LACTATE (LD) (LDH) ENZYME: CPT | Performed by: HOSPITALIST

## 2020-12-14 PROCEDURE — 82728 ASSAY OF FERRITIN: CPT | Performed by: HOSPITALIST

## 2020-12-14 PROCEDURE — 86140 C-REACTIVE PROTEIN: CPT | Performed by: HOSPITALIST

## 2020-12-14 PROCEDURE — 85025 COMPLETE CBC W/AUTO DIFF WBC: CPT | Performed by: HOSPITALIST

## 2020-12-14 PROCEDURE — 85007 BL SMEAR W/DIFF WBC COUNT: CPT | Performed by: HOSPITALIST

## 2020-12-14 NOTE — PROGRESS NOTES
[address] [facility],2Nd Floor Patient Status:  Inpatient    1950 MRN [id number]   [facility] 3NW-A Attending Magy Garcia, *   Hosp Day # 6 PCP Kathy Martinez MD     SUBJECTIVE: Oxygen requirements up overnight.   He states t exudates, moist mucous membranes                          Lungs: Clear to auscultation bilaterally, no wheezes or crackles                           Chest wall: No tenderness or deformity.                           UCUVU: AWFJEKG rate and rhythm, normal S1S independently visualized all relevant chest imaging in PACS. I agree with the radiology interpretation except where noted. ASSESSMENT/PLAN:  1. Acute hypoxic respiratory failure.  Due to COVID.  CT with extensive GGO infiltrates compared to recent ba

## 2020-12-14 NOTE — PROGRESS NOTES
[facility] hospitalist daily note    S; pt on vapotherm    Medications in Epic    PE    12/14/20  1611   BP: 127/82   Pulse: 66   Resp: 20   Temp: 97.7 °F (36.5 °C)     To preserve PPE did not go into room.  Pulmonology doctor's exam reviewed and spoke with pt's R

## 2020-12-14 NOTE — COVID NURSING ASSESSMENT
\"  COVID-19 Daily Discharge Readiness-Nursing    O2 Sat at Rest:   90  %   O2 Sat with Exertion:    % on    liters   Temperature max from last 24 hrs: Temp (24hrs), Av °F (36.7 °C), Min:97.2 °F (36.2 °C), Max:98.4 °F (36.9 °C)    Inflammatory Markers:

## 2020-12-14 NOTE — PROGRESS NOTES
Patient did well overnight with vapotherm. No changes made. SpO2 92%. Will continue to monitor. 12/14/20 0549   Oxygen Utilization   $ RT Standby Charge (per 15 min) 1   $ Oxygen in use?  Yes   O2 Device High flow/High humidity   O2 Flow Rate (L/min) 3

## 2020-12-14 NOTE — COVID NURSING ASSESSMENT
COVID-19 Daily Discharge Readiness-Nursing    O2 Sat at Rest:    94 % 30L vapotherm, 70% FiO2  Temperature max from last 24 hrs: Temp (24hrs), Av.6 °F (36.4 °C), Min:97.2 °F (36.2 °C), Max:98.4 °F (36.9 °C)    Inflammatory Markers:   Recent Labs   Lab

## 2020-12-15 PROCEDURE — 82728 ASSAY OF FERRITIN: CPT | Performed by: HOSPITALIST

## 2020-12-15 PROCEDURE — 83615 LACTATE (LD) (LDH) ENZYME: CPT | Performed by: HOSPITALIST

## 2020-12-15 PROCEDURE — 85379 FIBRIN DEGRADATION QUANT: CPT | Performed by: HOSPITALIST

## 2020-12-15 PROCEDURE — 85027 COMPLETE CBC AUTOMATED: CPT | Performed by: HOSPITALIST

## 2020-12-15 PROCEDURE — 85007 BL SMEAR W/DIFF WBC COUNT: CPT | Performed by: HOSPITALIST

## 2020-12-15 PROCEDURE — 86140 C-REACTIVE PROTEIN: CPT | Performed by: HOSPITALIST

## 2020-12-15 PROCEDURE — 85025 COMPLETE CBC W/AUTO DIFF WBC: CPT | Performed by: HOSPITALIST

## 2020-12-15 NOTE — PROGRESS NOTES
[facility],2Nd Floor Patient Status:  Inpatient    1950 MRN [id number]   [facility] 3NW-A Attending Carlos Pickard, *   Hosp Day # 7 PCP Shellie Cummins MD     SUBJECTIVE:  Patient states that he is feeling better.                         Chest wall: No tenderness or deformity.                         LLEMO: DLKGMDK rate and rhythm, normal S1S2                          Abdomen: soft, non-tender, non-distended, positive BS.                           Extremity: No cl yrs ago - no signs of emphysema on baseline CT  4. CAD - patient of Abbie Pinzon cath  - Echo with preserved EF   5.  Dispo - Full code  -will follow   -pt critically ill and at risk for further decompensation    Critical care time > 35 minutes    Korina

## 2020-12-15 NOTE — COVID NURSING ASSESSMENT
COVID-19 Daily Discharge Readiness-Nursing    O2 Sat at Rest: 96    %-Vapotherm 25 LPM/60%  FiO2  O2 Sat with Exertion:  79  % on  0  liters   Temperature max from last 24 hrs: Temp (24hrs), Av.1 °F (36.7 °C), Min:97.7 °F (36.5 °C), Max:98.4 °F (36.9 °

## 2020-12-15 NOTE — DIETARY NOTE
[facility]     Admitting diagnosis:  Hypoxia [R09.02]  Pneumonia due to COVID-19 virus [U07.1, J12.89]    Ht:  182.9 cm  Wt: 88.5 kg (195 lb 1.7 oz). This is 109 % of IBW  Body mass index is 26.46 kg/m².   IBW: 81k

## 2020-12-16 PROCEDURE — 82728 ASSAY OF FERRITIN: CPT | Performed by: HOSPITALIST

## 2020-12-16 PROCEDURE — 86140 C-REACTIVE PROTEIN: CPT | Performed by: HOSPITALIST

## 2020-12-16 PROCEDURE — 85379 FIBRIN DEGRADATION QUANT: CPT | Performed by: HOSPITALIST

## 2020-12-16 PROCEDURE — 83615 LACTATE (LD) (LDH) ENZYME: CPT | Performed by: HOSPITALIST

## 2020-12-16 PROCEDURE — 85007 BL SMEAR W/DIFF WBC COUNT: CPT | Performed by: HOSPITALIST

## 2020-12-16 PROCEDURE — 85025 COMPLETE CBC W/AUTO DIFF WBC: CPT | Performed by: HOSPITALIST

## 2020-12-16 PROCEDURE — 85027 COMPLETE CBC AUTOMATED: CPT | Performed by: HOSPITALIST

## 2020-12-16 RX ORDER — DEXAMETHASONE SODIUM PHOSPHATE 4 MG/ML
6 VIAL (ML) INJECTION DAILY
Status: DISCONTINUED | OUTPATIENT
Start: 2020-12-16 | End: 2020-12-19

## 2020-12-16 RX ORDER — FAMOTIDINE 20 MG/1
20 TABLET ORAL 2 TIMES DAILY
Status: DISCONTINUED | OUTPATIENT
Start: 2020-12-16 | End: 2020-12-20

## 2020-12-16 NOTE — PLAN OF CARE
Problem: RESPIRATORY - ADULT  Goal: Achieves optimal ventilation and oxygenation  Description: INTERVENTIONS:  - Assess for changes in respiratory status  - Assess for changes in mentation and behavior  - Position to facilitate oxygenation and minimize r assistive devices as appropriate  - Consider OT/PT consult to assist with strengthening/mobility  - Encourage toileting schedule  Outcome: Progressing

## 2020-12-16 NOTE — PROGRESS NOTES
[address] [facility],2Nd Floor Patient Status:  Inpatient    1950 MRN [id number]   [facility] 3NW-A Attending Darian Coffey, *   Hosp Day # 8 PCP Shanti Farr MD     SUBJECTIVE:  Patient weaned to nasal cannula yesterday.   H clear without erythema or exudates, moist mucous membranes                          Lungs: scattered crackles                           Chest wall: No tenderness or deformity.                           FTIQK: WGHOXMQ rate and rhythm, normal S1S2             D-dimer is low - cont prophylactic doses  -Echo with preserved cardiac function and mildly elevated PA pressures  -s/p 7 day course of meropenem  -WALLY, ANCA negative;  RF normal  3. Hx of smoking - quit 10 yrs ago - no signs of emphysema on baseline CT  4.

## 2020-12-16 NOTE — PROGRESS NOTES
[facility] hospitalist daily note  Pt was seen/examined on 12/15/20     S; pt on high flow NC.  No chest pain, no abd pain, no nausea  + passing gas and urinating     Medications in Epic     PE 98F 69 98/56  Gen: awake, alert, Oriented, , O2 in place  HEENT; mmm,

## 2020-12-16 NOTE — COVID NURSING ASSESSMENT
COVID-19 Daily Discharge Readiness-Nursing    O2 Sat at Rest:    96  %   O2 Sat with Exertion:91    % on   12 liters   Temperature max from last 24 hrs: Temp (24hrs), Av °F (36.7 °C), Min:97.6 °F (36.4 °C), Max:98.3 °F (36.8 °C)    Inflammatory Markers

## 2020-12-16 NOTE — PLAN OF CARE
Problem: RESPIRATORY - ADULT  Goal: Achieves optimal ventilation and oxygenation  Description: INTERVENTIONS:  - Assess for changes in respiratory status  - Assess for changes in mentation and behavior  - Position to facilitate oxygenation and minimize r risk of injury  - Provide assistive devices as appropriate  - Consider OT/PT consult to assist with strengthening/mobility  - Encourage toileting schedule  Outcome: Progressing

## 2020-12-16 NOTE — PROGRESS NOTES
[facility] hospitalit daily note    S; patient was seen via camera to conserve PPE.  Pulm reviewed  Spoke with RN, plt's O2 requirement down to 9L today  Pt denies chest pain, breathing ok, no abd pain, no nausea  +urinating and passing gas, per pt he had BM    Me

## 2020-12-16 NOTE — COVID NURSING ASSESSMENT
COVID-19 Daily Discharge Readiness-Nursing    O2 Sat at Rest:    94 %   O2 Sat with Exertion:    92 % on   9  liters   Temperature max from last 24 hrs: Temp (24hrs), Av.9 °F (36.6 °C), Min:97.6 °F (36.4 °C), Max:98.3 °F (36.8 °C)    Inflammatory Marke

## 2020-12-17 PROCEDURE — 76937 US GUIDE VASCULAR ACCESS: CPT

## 2020-12-17 PROCEDURE — 80048 BASIC METABOLIC PNL TOTAL CA: CPT | Performed by: HOSPITALIST

## 2020-12-17 PROCEDURE — 36410 VNPNXR 3YR/> PHY/QHP DX/THER: CPT

## 2020-12-17 PROCEDURE — 85027 COMPLETE CBC AUTOMATED: CPT | Performed by: HOSPITALIST

## 2020-12-17 RX ORDER — MAGNESIUM OXIDE 400 MG (241.3 MG MAGNESIUM) TABLET
3 TABLET NIGHTLY
Status: DISCONTINUED | OUTPATIENT
Start: 2020-12-17 | End: 2020-12-20

## 2020-12-17 NOTE — PLAN OF CARE
Problem: RESPIRATORY - ADULT  Goal: Achieves optimal ventilation and oxygenation  Description: INTERVENTIONS:  - Assess for changes in respiratory status  - Assess for changes in mentation and behavior  - Position to facilitate oxygenation and minimize r Modify environment to reduce risk of injury  - Provide assistive devices as appropriate  - Consider OT/PT consult to assist with strengthening/mobility  - Encourage toileting schedule  Outcome: Progressing

## 2020-12-17 NOTE — COVID NURSING ASSESSMENT
COVID-19 Daily Discharge Readiness-Nursing    O2 Sat at Rest:     94  %   O2 Sat with Exertion:    93% on    10liters   Temperature max from last 24 hrs: Temp (24hrs), Av.5 °F (36.4 °C), Min:97.2 °F (36.2 °C), Max:97.7 °F (36.5 °C)    Inflammatory Dulce Distel

## 2020-12-17 NOTE — PROGRESS NOTES
Pulmonary Progress Note      NAME: Jose Braxton - ROOM: 228/683-K - MRN: [id number] - Age: 78year old - : 1950    Assessment/Plan:  1. Acute hypoxic respiratory failure. Due to COVID.  CT with extensive GGO infiltrates compared to recent baseline. normal.  No thrush noted. Neck: supple without mass   Lungs: clear   Chest wall: No tenderness or deformity. Heart: Regular rate and rhythm, normal S1S2, no murmur. Abdomen: soft, non-tender, non-distended, positive BS.    Extremity: no edema   Skin:

## 2020-12-17 NOTE — COVID NURSING ASSESSMENT
COVID-19 Daily Discharge Readiness-Nursing    Temperature max from last 24 hrs: Temp (24hrs), Av.7 °F (36.5 °C), Min:97.2 °F (36.2 °C), Max:98.3 °F (36.8 °C)    Inflammatory Markers:   Recent Labs   Lab 12/15/20  0805 20  0541   CRP 0.34* <0.29

## 2020-12-17 NOTE — PROGRESS NOTES
[facility] hospitalit daily note    S: reporting SOB improving little by little. No current complaints.      Medications in Epic    PE    12/17/20  1545   BP: 130/70   Pulse: 58   Resp: 18   Temp: 97.7 °F (36.5 °C)     Gen: No acute distress, alert and oriented

## 2020-12-18 PROCEDURE — 97162 PT EVAL MOD COMPLEX 30 MIN: CPT

## 2020-12-18 PROCEDURE — 97530 THERAPEUTIC ACTIVITIES: CPT

## 2020-12-18 NOTE — PROGRESS NOTES
[facility] hospitalit daily note    S: SOB feeling better today than yesterday. Steadily improving. SOB with little ambulation.      Medications in Epic    PE    12/18/20  1153   BP: 104/61   Pulse: 61   Resp: 18   Temp: 98.3 °F (36.8 °C)     Gen: No acute distres

## 2020-12-18 NOTE — COVID NURSING ASSESSMENT
COVID-19 Daily Discharge Readiness-Nursing    O2 Sat at Rest: 92  % on 5L  O2 Sat with Exertion: SPO2 Ambulation on Oxygen: 89  % on Ambulation oxygen flow (liters per minute): 15  liters   Temperature max from last 24 hrs: Temp (24hrs), Av.2 °F (36.8

## 2020-12-18 NOTE — HOME CARE LIAISON
TNL spoke with the ptnt and discuss the benefits of HH on dc. Ptnt declined [place] for now. Ptnt stated he wanted to think about [place]  some more and was advised if he changes his mind to let the staff know and they will contact [facility].  Ankush brenner    Th

## 2020-12-18 NOTE — PROGRESS NOTES
[facility] PULMONARY/CRITICAL CARE    S: Pt still has dyspnea on exertion with walking to the bathroom, desaturates easily.      Meds:  • melatonin  3 mg Oral Nightly   • dexamethasone Sodium Phosphate  6 mg Intravenous Daily   • famotidine  20 mg Oral BID   • lid Lyle Bull neg for PE but shows extensive GGO infiltrates    -wean O2 as able, likely will need supplemental O2 upon d/c home  -COVID treatments as below  2.  COVID pneumonia - test + 11/27  -O2 as above  -monitor inflammatory markers  -dexamethasone bid (

## 2020-12-18 NOTE — COVID NURSING ASSESSMENT
COVID-19 Daily Discharge Readiness-Nursing    O2 Sat at Rest:     98% on 6L NC  O2 Sat with Exertion:93    % on   10 liters   Temperature max from last 24 hrs: Temp (24hrs), Av.8 °F (36.6 °C), Min:97.6 °F (36.4 °C), Max:98.6 °F (37 °C)    Inflammatory

## 2020-12-18 NOTE — CM/SW NOTE
MSW acknowledged recommendation for HHC. Referred to [facility]  P:[phone]  F:[phone]. Liaison will offer services, provide choice and financial disclosure. Order and F2F completed. SW will continue to monitor for home 02.

## 2020-12-18 NOTE — PHYSICAL THERAPY NOTE
PHYSICAL THERAPY EVALUATION - INPATIENT     Room Number: 323/323-A  Evaluation Date: 12/18/2020  Type of Evaluation: Initial  Physician Order: PT Eval and Treat    Presenting Problem: +COVID19  Reason for Therapy: Mobility Dysfunction and Discharge P Lives With: Spouse     Patient Owned Equipment: None       Prior Level of [place]lings: Pt reports ind with all mobility and ADLs. Pt reports was cleaning up leaves outside and denies falls.       SUBJECTIVE  \"I haven't really gotten out of b 15ftx1  Assistive Device: None  Pattern: Within Functional Limits          Skilled Therapy Provided: Pt received supine in bed, agreeable to PT. Pt demos supine to sit ind. Sitting eob with dec in O2 to 87%, O2 increased to 10L.   Sit to/from stand with campos motion;Strengthening;Stair training;Transfer training;Balance training  Rehab Potential : Good  Frequency (Obs): 3-5x/week  Number of Visits to Meet Established Goals: 5      CURRENT GOALS    Goal #1    Goal #2    Goal #3 Patient is able to ambulate 50 fee

## 2020-12-19 PROCEDURE — 82728 ASSAY OF FERRITIN: CPT | Performed by: HOSPITALIST

## 2020-12-19 PROCEDURE — 85379 FIBRIN DEGRADATION QUANT: CPT | Performed by: HOSPITALIST

## 2020-12-19 PROCEDURE — 83615 LACTATE (LD) (LDH) ENZYME: CPT | Performed by: HOSPITALIST

## 2020-12-19 PROCEDURE — 86140 C-REACTIVE PROTEIN: CPT | Performed by: HOSPITALIST

## 2020-12-19 PROCEDURE — 85025 COMPLETE CBC W/AUTO DIFF WBC: CPT | Performed by: HOSPITALIST

## 2020-12-19 PROCEDURE — 80048 BASIC METABOLIC PNL TOTAL CA: CPT | Performed by: HOSPITALIST

## 2020-12-19 RX ORDER — POTASSIUM CHLORIDE 20 MEQ/1
40 TABLET, EXTENDED RELEASE ORAL ONCE
Status: COMPLETED | OUTPATIENT
Start: 2020-12-19 | End: 2020-12-19

## 2020-12-19 NOTE — PROGRESS NOTES
[facility] hospitalist daily note    S: Pt seen and examined. Weaned down to 3 L/min O2. States breathing is improved. No F/C.       Medications in Epic    PE    12/19/20  1225   BP: 101/62   Pulse: 58   Resp: 20   Temp: 97.9 °F (36.6 °C)     Gen: No acute dist

## 2020-12-19 NOTE — PROGRESS NOTES
[facility] Pharmacy Note: Route Optimization for Dexamethasone (Decadron)    Patient is currently on Dexamethasone (Decadron) 6 mg IV every 24 hours.    The patient meets the criteria to convert to the oral equivalent as established by the IV to Oral conversion pr

## 2020-12-19 NOTE — PLAN OF CARE
COVID-19 Daily Discharge Readiness-Nursing    O2 Sat at Rest:   95  % on 3L NC   O2 Sat with Exertion: SPO2 Ambulation on Oxygen: 89  % on Ambulation oxygen flow (liters per minute): 15  liters   Temperature max from last 24 hrs: Temp (24hrs), Av.7 °F

## 2020-12-19 NOTE — COVID NURSING ASSESSMENT
COVID-19 Daily Discharge Readiness-Nursing    O2 Sat at Rest:    92 %  On 5L  O2 Sat with Exertion: SPO2 Ambulation on Oxygen: 89  % on Ambulation oxygen flow (liters per minute): 15  liters   Temperature max from last 24 hrs: Temp (24hrs), Av °F (36.7

## 2020-12-19 NOTE — PROGRESS NOTES
12/19/20 1530   Mobility   O2 walk?  Yes   SPO2 Ambulation on Oxygen 85   Ambulation oxygen flow (liters per minute) 10

## 2020-12-19 NOTE — PROGRESS NOTES
[facility] PULMONARY/CRITICAL CARE    S: Pt still has dyspnea on exertion with walking to the bathroom     Meds:  • Potassium Chloride ER  40 mEq Oral Once   • [START ON 12/20/2020] dexamethasone  6 mg Oral Daily with breakfast   • melatonin  3 mg Oral Nightly - due to COVID pneumonia.  CTA neg for PE but shows extensive GGO infiltrates    -wean O2 as able, likely will need supplemental O2 upon d/c home  -COVID treatments as below  2.  COVID pneumonia - test + 11/27  -O2 as above  -monitor inflammatory markers  -

## 2020-12-20 VITALS
TEMPERATURE: 98 F | SYSTOLIC BLOOD PRESSURE: 105 MMHG | HEART RATE: 54 BPM | DIASTOLIC BLOOD PRESSURE: 69 MMHG | OXYGEN SATURATION: 97 % | RESPIRATION RATE: 18 BRPM | BODY MASS INDEX: 26 KG/M2 | WEIGHT: 191.31 LBS

## 2020-12-20 PROCEDURE — 85025 COMPLETE CBC W/AUTO DIFF WBC: CPT | Performed by: INTERNAL MEDICINE

## 2020-12-20 PROCEDURE — 86140 C-REACTIVE PROTEIN: CPT | Performed by: HOSPITALIST

## 2020-12-20 PROCEDURE — 83615 LACTATE (LD) (LDH) ENZYME: CPT | Performed by: HOSPITALIST

## 2020-12-20 PROCEDURE — 80048 BASIC METABOLIC PNL TOTAL CA: CPT | Performed by: INTERNAL MEDICINE

## 2020-12-20 PROCEDURE — 82728 ASSAY OF FERRITIN: CPT | Performed by: HOSPITALIST

## 2020-12-20 NOTE — COVID NURSING ASSESSMENT
COVID-19 Daily Discharge Readiness-Nursing    O2 Sat at Rest:     98% on 3L   O2 Sat with Exertion: SPO2 Ambulation on Oxygen: 85  % on Ambulation oxygen flow (liters per minute): 10  liters   Temperature max from last 24 hrs: Temp (24hrs), Av.8 °F (36

## 2020-12-20 NOTE — CM/SW NOTE
12/20/20 1600   Discharge disposition   Expected discharge disposition Home-Health   Name of Facillity/Home Care/Hospice [facility]   HME provider Vamshi Beaulieu arranged for Home O2.  RT to bring portable to room and pt to call them whe

## 2020-12-20 NOTE — PROGRESS NOTES
[facility] PULMONARY/CRITICAL CARE    S: Pt is feeling ok. Not too much dyspnea. He says he wants to get a covid vaccine before he leaves.     Meds:  • dexamethasone  6 mg Oral Daily with breakfast   • melatonin  3 mg Oral Nightly   • famotidine  20 mg Oral BID -wean O2 as able, likely will need supplemental O2 upon d/c home; will order home O2 study  -COVID treatments as below  2.  COVID pneumonia - test + 11/27  -O2 as above  -monitor inflammatory markers  -dexamethasone bid (12/8 - 12/15), decreased to daily

## 2020-12-20 NOTE — COVID NURSING ASSESSMENT
COVID-19 Daily Discharge Readiness-Nursing    O2 Sat at Rest:   95  % on 2L NC  O2 Sat with Exertion: SPO2 Ambulation on Oxygen: 85  % on Ambulation oxygen flow (liters per minute): 10  liters   Temperature max from last 24 hrs: Temp (24hrs), Av.9 °F (

## 2020-12-20 NOTE — PROGRESS NOTES
[facility] hospitalist daily note    S: Pt seen and examined. Weaned down to 2 L/min O2 but desaturates with activity. States breathing is improved. No F/C.       Medications in Epic    PE    12/20/20  0758   BP: 129/78   Pulse: 60   Resp: 18   Temp: 97.8 °F (3

## 2020-12-20 NOTE — CM/SW NOTE
Rn informed  that pt may dc today and now is in agreement to [facility] ,  [facility] notified. RN will do O2 test- home O2 anticipated.     Quynh Davidson LCSW  /Discharge Planner  [phone]

## 2020-12-20 NOTE — CM/SW NOTE
Received order for Home O2 sent to [facility]- await acceptance.     Agustin Sellers LCSW  /Discharge Planner  [phone]

## 2020-12-20 NOTE — PROGRESS NOTES
12/20/20 1300   Mobility   O2 walk?  Yes   SPO2 on Room Air at Rest 87   SPO2 Ambulation on Room Air 89   SPO2 Ambulation on Oxygen 92   Ambulation oxygen flow (liters per minute) 2

## 2020-12-20 NOTE — HOME CARE LIAISON
MET WITH PTNT AND OFFERED CHOICE  OF AGENCIES. PTNT AGREEABLE TO [facility]. MET WITH PTNT TO DISCUSS HOME HEALTH SERVICES AND COVERAGE CRITERIA. PTNT AGREEABLE TO [facility]. PTNT GIVEN RESIDENTIAL BROCHURE.  [facility] WITH PROVIDE SN/PT ON DISC

## 2020-12-21 ENCOUNTER — PATIENT OUTREACH (OUTPATIENT)
Dept: CASE MANAGEMENT | Age: 70
End: 2020-12-21

## 2020-12-21 NOTE — PROGRESS NOTES
1st attempt [facility] apt request    [facility]  [address]  [phone]  [address]  Apt made for Wed.  Dec. 23rd @[facility]

## 2020-12-21 NOTE — PROGRESS NOTES
NURSING DISCHARGE NOTE    Discharged Home via Wheelchair. Accompanied by Support staff  Belongings Taken by patient/family. Reviewed all discharge instructions and follow up procedures. Pulse ox sent with patient, no new prescriptions.  Spoke to [facility]

## 2020-12-21 NOTE — CM/SW NOTE
Rn asked MSW to call pt and his spouse about home health. Wife is not sure who the home health is.  MSW called and spoke to pt and updated him that his home health is   [facility]  P:[phone]  Y:[phone]   Pt states he is doing well

## 2020-12-22 NOTE — DISCHARGE SUMMARY
General Medicine Discharge Summary     Patient ID:  Steven Omaha  78year old  1/6/1950    Admit date: 12/8/2020    Discharge date and time: 12/20/2020  6:38 PM     Attending Physician: Ajith Stein ONE TABLET BY MOUTH THREE TIMES DAILY., Normal, Disp-90 tablet, R-3    Clopidogrel Bisulfate 75 MG Oral Tab  Take 1 tablet (75 mg total) by mouth daily. , Normal, Disp-30 tablet, R-10          Activity: activity as tolerated  Diet: cardiac diet  Wound Care:

## 2020-12-23 ENCOUNTER — TELEMEDICINE (OUTPATIENT)
Dept: CARDIOLOGY CLINIC | Facility: HOSPITAL | Age: 70
End: 2020-12-23
Payer: MEDICARE

## 2020-12-23 PROCEDURE — 99214 OFFICE O/P EST MOD 30 MIN: CPT | Performed by: CLINICAL NURSE SPECIALIST

## 2020-12-23 NOTE — PATIENT INSTRUCTIONS
Okay to wean oxygen down to 3L nc with activity as long as oxygen saturation levels remain >90%    If your nose becomes dry and/or irritate, okay to purchase [place] from the pharmacy. Do not use vasoline in your nose.     If you have an incentive spirometer (a

## 2020-12-23 NOTE — PROGRESS NOTES
[address] [place] [address]  Patient Status:  Outpatient    1950 MRN [id number]   Location MD Dr Trang Lopez is a 78year old male who is present on video visit for COV 12/20/2020 06:50 AM    CA 9.1 12/20/2020 06:50 AM    ALB 2.3 (L) 12/13/2020 10:10 AM    ALKPHO 59 12/13/2020 10:10 AM    BILT 0.3 12/13/2020 10:10 AM    TP 5.4 (L) 12/13/2020 10:10 AM    AST 48 (H) 12/13/2020 10:10 AM    ALT 74 (H) 12/13/2020 10:10 AM    P visit and 6MWT  - Follow up in [address] [place] [address] on 1/5/2020  - Appointment with pulmonolgy recommended. Consider follow up with Dr Christiano Rea in 4-6 weeks. - Will need new PCP after Jan 1 due to insurance.         Plan:  Okay to wean oxygen

## 2020-12-24 NOTE — CDS QUERY
Uncertain Diagnosis  CLINICAL DOCUMENTATION CLARIFICATION FORM  Dear Doctor:  Clinical information (provided below) indicates a documented diagnosis of Sepsis.  For accurate ICD-10-CM code assignment,   PLEASE clarify the documented diagnosis of sepsis

## 2020-12-31 ENCOUNTER — PATIENT OUTREACH (OUTPATIENT)
Dept: CASE MANAGEMENT | Age: 70
End: 2020-12-31

## 2020-12-31 NOTE — PROGRESS NOTES
Patient called requesting help with scheduling         , Ruel Edouard MD  Pulmonary   [address] [place] [id number]   Spoke with 's office advise they will call patient to follow up

## 2021-01-19 ENCOUNTER — TELEPHONE (OUTPATIENT)
Dept: CARDIOLOGY | Age: 71
End: 2021-01-19

## 2021-01-20 NOTE — PROCEDURES
[facility]    PATIENT'S NAME: Yumiko West   ATTENDING PHYSICIAN: Christin Pham M.D. OPERATING PHYSICIAN: Christin Pham M.D.    PATIENT ACCOUNT#:   [de-identified]    LOCATION:  [address]  MEDICAL RECORD #:   [id number]       DATE OF

## 2021-02-22 ENCOUNTER — TELEPHONE (OUTPATIENT)
Dept: CARDIOLOGY | Age: 71
End: 2021-02-22

## 2021-02-22 RX ORDER — CLOPIDOGREL BISULFATE 75 MG/1
75 TABLET ORAL DAILY
Qty: 90 TABLET | Refills: 1 | Status: SHIPPED | OUTPATIENT
Start: 2021-02-22

## 2021-03-17 ENCOUNTER — IMMUNIZATION (OUTPATIENT)
Dept: LAB | Age: 71
End: 2021-03-17

## 2021-03-17 DIAGNOSIS — Z23 NEED FOR VACCINATION: Primary | ICD-10-CM

## 2021-03-17 PROCEDURE — 91300 COVID 19 PFIZER-BIONTECH: CPT

## 2021-03-17 PROCEDURE — 0001A COVID 19 PFIZER-BIONTECH: CPT

## 2021-04-07 ENCOUNTER — IMMUNIZATION (OUTPATIENT)
Dept: LAB | Age: 71
End: 2021-04-07
Attending: HOSPITALIST

## 2021-04-07 DIAGNOSIS — Z23 NEED FOR VACCINATION: Primary | ICD-10-CM

## 2021-04-07 PROCEDURE — 91300 COVID 19 PFIZER-BIONTECH: CPT

## 2021-04-07 PROCEDURE — 0002A COVID 19 PFIZER-BIONTECH: CPT

## 2021-07-29 ENCOUNTER — ORDER TRANSCRIPTION (OUTPATIENT)
Dept: PHYSICAL THERAPY | Facility: HOSPITAL | Age: 71
End: 2021-07-29

## 2021-07-29 DIAGNOSIS — R26.9 GAIT DIFFICULTY: ICD-10-CM

## 2021-07-29 DIAGNOSIS — R26.89 POOR BALANCE: Primary | ICD-10-CM

## 2021-08-31 ENCOUNTER — APPOINTMENT (OUTPATIENT)
Dept: PHYSICAL THERAPY | Facility: HOSPITAL | Age: 71
End: 2021-08-31
Attending: INTERNAL MEDICINE
Payer: MEDICARE

## 2021-09-02 ENCOUNTER — APPOINTMENT (OUTPATIENT)
Dept: PHYSICAL THERAPY | Facility: HOSPITAL | Age: 71
End: 2021-09-02
Attending: INTERNAL MEDICINE
Payer: MEDICARE

## 2021-09-09 ENCOUNTER — APPOINTMENT (OUTPATIENT)
Dept: PHYSICAL THERAPY | Facility: HOSPITAL | Age: 71
End: 2021-09-09
Payer: MEDICARE

## 2021-09-14 ENCOUNTER — APPOINTMENT (OUTPATIENT)
Dept: PHYSICAL THERAPY | Facility: HOSPITAL | Age: 71
End: 2021-09-14
Payer: MEDICARE

## 2021-09-16 ENCOUNTER — APPOINTMENT (OUTPATIENT)
Dept: PHYSICAL THERAPY | Facility: HOSPITAL | Age: 71
End: 2021-09-16
Payer: MEDICARE

## 2021-09-21 ENCOUNTER — APPOINTMENT (OUTPATIENT)
Dept: PHYSICAL THERAPY | Facility: HOSPITAL | Age: 71
End: 2021-09-21
Payer: MEDICARE

## 2021-09-23 ENCOUNTER — APPOINTMENT (OUTPATIENT)
Dept: PHYSICAL THERAPY | Facility: HOSPITAL | Age: 71
End: 2021-09-23
Payer: MEDICARE

## 2021-09-28 ENCOUNTER — APPOINTMENT (OUTPATIENT)
Dept: PHYSICAL THERAPY | Facility: HOSPITAL | Age: 71
End: 2021-09-28
Payer: MEDICARE

## 2021-10-19 ENCOUNTER — APPOINTMENT (OUTPATIENT)
Dept: PHYSICAL THERAPY | Facility: HOSPITAL | Age: 71
End: 2021-10-19
Attending: INTERNAL MEDICINE
Payer: MEDICARE

## 2021-10-21 ENCOUNTER — APPOINTMENT (OUTPATIENT)
Dept: PHYSICAL THERAPY | Facility: HOSPITAL | Age: 71
End: 2021-10-21
Attending: INTERNAL MEDICINE
Payer: MEDICARE

## 2021-10-26 ENCOUNTER — APPOINTMENT (OUTPATIENT)
Dept: PHYSICAL THERAPY | Facility: HOSPITAL | Age: 71
End: 2021-10-26
Attending: INTERNAL MEDICINE
Payer: MEDICARE

## 2021-10-28 ENCOUNTER — APPOINTMENT (OUTPATIENT)
Dept: PHYSICAL THERAPY | Facility: HOSPITAL | Age: 71
End: 2021-10-28
Attending: INTERNAL MEDICINE
Payer: MEDICARE

## 2021-11-02 ENCOUNTER — APPOINTMENT (OUTPATIENT)
Dept: PHYSICAL THERAPY | Facility: HOSPITAL | Age: 71
End: 2021-11-02
Attending: INTERNAL MEDICINE
Payer: MEDICARE

## 2021-11-04 ENCOUNTER — APPOINTMENT (OUTPATIENT)
Dept: PHYSICAL THERAPY | Facility: HOSPITAL | Age: 71
End: 2021-11-04
Attending: INTERNAL MEDICINE
Payer: MEDICARE

## 2021-11-09 ENCOUNTER — APPOINTMENT (OUTPATIENT)
Dept: PHYSICAL THERAPY | Facility: HOSPITAL | Age: 71
End: 2021-11-09
Attending: INTERNAL MEDICINE
Payer: MEDICARE

## 2021-11-11 ENCOUNTER — APPOINTMENT (OUTPATIENT)
Dept: PHYSICAL THERAPY | Facility: HOSPITAL | Age: 71
End: 2021-11-11
Attending: INTERNAL MEDICINE
Payer: MEDICARE

## 2021-11-16 ENCOUNTER — APPOINTMENT (OUTPATIENT)
Dept: PHYSICAL THERAPY | Facility: HOSPITAL | Age: 71
End: 2021-11-16
Attending: INTERNAL MEDICINE
Payer: MEDICARE

## 2021-11-18 ENCOUNTER — APPOINTMENT (OUTPATIENT)
Dept: PHYSICAL THERAPY | Facility: HOSPITAL | Age: 71
End: 2021-11-18
Attending: INTERNAL MEDICINE
Payer: MEDICARE

## 2021-12-20 ENCOUNTER — TELEPHONE (OUTPATIENT)
Dept: CARDIOLOGY | Age: 71
End: 2021-12-20

## 2023-01-15 NOTE — COVID NURSING ASSESSMENT
COVID-19 Daily Discharge Readiness-Nursing    O2 Sat at Rest:   93% on 40L/55%   O2 Sat with Exertion:     Temperature max from last 24 hrs: Temp (24hrs), Av.9 °F (36.6 °C), Min:97.3 °F (36.3 °C), Max:98.7 °F (37.1 °C)    Inflammatory Markers:   Recent generalized

## 2023-05-28 ENCOUNTER — HOSPITAL ENCOUNTER (EMERGENCY)
Facility: HOSPITAL | Age: 73
Discharge: HOME | End: 2023-05-29
Attending: EMERGENCY MEDICINE
Payer: MEDICARE

## 2023-05-28 ENCOUNTER — APPOINTMENT (OUTPATIENT)
Dept: CT IMAGING | Facility: HOSPITAL | Age: 73
End: 2023-05-28
Attending: EMERGENCY MEDICINE
Payer: MEDICARE

## 2023-05-28 DIAGNOSIS — N41.0 ACUTE PROSTATITIS: Primary | ICD-10-CM

## 2023-05-28 LAB
ALBUMIN SERPL-MCNC: 3.8 G/DL (ref 3.4–5)
ALBUMIN/GLOB SERPL: 1.4 {RATIO} (ref 1–2)
ALP LIVER SERPL-CCNC: 62 U/L
ALT SERPL-CCNC: 38 U/L
ANION GAP SERPL CALC-SCNC: 2 MMOL/L (ref 0–18)
AST SERPL-CCNC: 25 U/L (ref 15–37)
BASOPHILS # BLD AUTO: 0.04 X10(3) UL (ref 0–0.2)
BASOPHILS NFR BLD AUTO: 0.7 %
BILIRUB SERPL-MCNC: 0.5 MG/DL (ref 0.1–2)
BILIRUB UR QL STRIP.AUTO: NEGATIVE
BUN BLD-MCNC: 23 MG/DL (ref 7–18)
CALCIUM BLD-MCNC: 9 MG/DL (ref 8.5–10.1)
CHLORIDE SERPL-SCNC: 107 MMOL/L (ref 98–112)
CLARITY UR REFRACT.AUTO: CLEAR
CO2 SERPL-SCNC: 29 MMOL/L (ref 21–32)
COLOR UR AUTO: YELLOW
CREAT BLD-MCNC: 1 MG/DL
EOSINOPHIL # BLD AUTO: 0.06 X10(3) UL (ref 0–0.7)
EOSINOPHIL NFR BLD AUTO: 1 %
ERYTHROCYTE [DISTWIDTH] IN BLOOD BY AUTOMATED COUNT: 12 %
GFR SERPLBLD BASED ON 1.73 SQ M-ARVRAT: 80 ML/MIN/1.73M2 (ref 60–?)
GLOBULIN PLAS-MCNC: 2.7 G/DL (ref 2.8–4.4)
GLUCOSE BLD-MCNC: 147 MG/DL (ref 70–99)
GLUCOSE UR STRIP.AUTO-MCNC: NEGATIVE MG/DL
HCT VFR BLD AUTO: 42.8 %
HGB BLD-MCNC: 14.9 G/DL
IMM GRANULOCYTES # BLD AUTO: 0.01 X10(3) UL (ref 0–1)
IMM GRANULOCYTES NFR BLD: 0.2 %
KETONES UR STRIP.AUTO-MCNC: NEGATIVE MG/DL
LACTATE SERPL-SCNC: 1.4 MMOL/L (ref 0.4–2)
LEUKOCYTE ESTERASE UR QL STRIP.AUTO: NEGATIVE
LYMPHOCYTES # BLD AUTO: 1.89 X10(3) UL (ref 1–4)
LYMPHOCYTES NFR BLD AUTO: 31.7 %
MCH RBC QN AUTO: 32.2 PG (ref 26–34)
MCHC RBC AUTO-ENTMCNC: 34.8 G/DL (ref 31–37)
MCV RBC AUTO: 92.4 FL
MONOCYTES # BLD AUTO: 0.42 X10(3) UL (ref 0.1–1)
MONOCYTES NFR BLD AUTO: 7 %
NEUTROPHILS # BLD AUTO: 3.55 X10 (3) UL (ref 1.5–7.7)
NEUTROPHILS # BLD AUTO: 3.55 X10(3) UL (ref 1.5–7.7)
NEUTROPHILS NFR BLD AUTO: 59.4 %
NITRITE UR QL STRIP.AUTO: NEGATIVE
OSMOLALITY SERPL CALC.SUM OF ELEC: 292 MOSM/KG (ref 275–295)
PH UR STRIP.AUTO: 5 [PH] (ref 5–8)
PLATELET # BLD AUTO: 185 10(3)UL (ref 150–450)
POTASSIUM SERPL-SCNC: 3.9 MMOL/L (ref 3.5–5.1)
PROT SERPL-MCNC: 6.5 G/DL (ref 6.4–8.2)
PROT UR STRIP.AUTO-MCNC: NEGATIVE MG/DL
RBC # BLD AUTO: 4.63 X10(6)UL
RBC UR QL AUTO: NEGATIVE
SODIUM SERPL-SCNC: 138 MMOL/L (ref 136–145)
SP GR UR STRIP.AUTO: 1.01 (ref 1–1.03)
UROBILINOGEN UR STRIP.AUTO-MCNC: <2 MG/DL
WBC # BLD AUTO: 6 X10(3) UL (ref 4–11)

## 2023-05-28 PROCEDURE — 83605 ASSAY OF LACTIC ACID: CPT | Performed by: EMERGENCY MEDICINE

## 2023-05-28 PROCEDURE — 85025 COMPLETE CBC W/AUTO DIFF WBC: CPT | Performed by: EMERGENCY MEDICINE

## 2023-05-28 PROCEDURE — 87040 BLOOD CULTURE FOR BACTERIA: CPT | Performed by: EMERGENCY MEDICINE

## 2023-05-28 PROCEDURE — 74176 CT ABD & PELVIS W/O CONTRAST: CPT | Performed by: EMERGENCY MEDICINE

## 2023-05-28 PROCEDURE — 96365 THER/PROPH/DIAG IV INF INIT: CPT

## 2023-05-28 PROCEDURE — 80053 COMPREHEN METABOLIC PANEL: CPT | Performed by: EMERGENCY MEDICINE

## 2023-05-28 PROCEDURE — 36415 COLL VENOUS BLD VENIPUNCTURE: CPT

## 2023-05-28 PROCEDURE — 99285 EMERGENCY DEPT VISIT HI MDM: CPT

## 2023-05-28 PROCEDURE — 99284 EMERGENCY DEPT VISIT MOD MDM: CPT

## 2023-05-28 PROCEDURE — 81003 URINALYSIS AUTO W/O SCOPE: CPT | Performed by: EMERGENCY MEDICINE

## 2023-05-28 RX ORDER — SULFAMETHOXAZOLE AND TRIMETHOPRIM 800; 160 MG/1; MG/1
1 TABLET ORAL 2 TIMES DAILY
Qty: 60 TABLET | Refills: 0 | Status: SHIPPED | OUTPATIENT
Start: 2023-05-28 | End: 2023-06-27

## 2023-05-29 VITALS
HEIGHT: 72 IN | HEART RATE: 70 BPM | DIASTOLIC BLOOD PRESSURE: 78 MMHG | OXYGEN SATURATION: 97 % | BODY MASS INDEX: 24.38 KG/M2 | SYSTOLIC BLOOD PRESSURE: 129 MMHG | WEIGHT: 180 LBS | TEMPERATURE: 98 F | RESPIRATION RATE: 16 BRPM

## 2023-05-29 NOTE — ED INITIAL ASSESSMENT (HPI)
Patient here with c/o dysuria for a couple days. Patient also having some back pain and reports he thought he had a fever at home.

## 2023-10-13 NOTE — COVID NURSING ASSESSMENT
COVID-19 Daily Discharge Readiness-Nursing    O2 Sat at Rest:   99  %   O2 Sat with Exertion:    40L/100% Vapotherm  Temperature max from last 24 hrs: Temp (24hrs), Av.7 °F (36.5 °C), Min:97.2 °F (36.2 °C), Max:98 °F (36.7 °C)    Inflammatory Markers: History  Chief Complaint   Patient presents with    Fever    Shortness of Breath     Patient reports two Mondays ago was diagnosed with bronchitis   Patient was on abx and sterioids and this Tuesday started getting sick agains fevers, body aches, cough, sob     81-year-old female with history of a PE provoked her pregnancy about a year ago no longer on anticoagulation, presents with cough body aches, headache. Symptoms started slightly over a week ago and she was seen by her primary physician who diagnosed her with a bronchitis and was started on antibiotics, and Medrol. Symptoms improved slightly however then started to worsen when she describes palpitations and shortness of breath. She describes subjective fevers and chills as well as measured fevers. She took Motrin and Tylenol prior to arrival.        Prior to Admission Medications   Prescriptions Last Dose Informant Patient Reported? Taking?    PARoxetine (PAXIL) 40 MG tablet   No No   Sig: Take 1 tablet (40 mg total) by mouth every morning   Patient not taking: Reported on 7/27/2023   albuterol (ProAir HFA) 90 mcg/act inhaler   No No   Sig: Inhale 2 puffs every 6 (six) hours as needed for wheezing or shortness of breath   Patient not taking: Reported on 7/27/2023   clonazePAM (KlonoPIN) 0.5 mg tablet   No No   Sig: Take 1 tablet (0.5 mg total) by mouth 2 (two) times a day As needed   Patient not taking: Reported on 7/27/2023   doxycycline monohydrate (MONODOX) 100 mg capsule   No No   Sig: Take 1 capsule (100 mg total) by mouth 2 (two) times a day for 10 days   ergocalciferol (VITAMIN D2) 50,000 units   No No   Sig: Take 1 capsule (50,000 Units total) by mouth once a week   Patient not taking: Reported on 7/27/2023   guaifenesin-codeine (GUAIFENESIN AC) 100-10 MG/5ML liquid   No No   Sig: Take 5 mL by mouth 3 (three) times a day as needed for cough   methylPREDNISolone 4 MG tablet therapy pack   No No   Sig: Use as directed on package   metoprolol tartrate (LOPRESSOR) 25 mg tablet   No No   Sig: Take 1 tablet (25 mg total) by mouth every 12 (twelve) hours   Patient not taking: Reported on 2023   polymyxin b-trimethoprim (POLYTRIM) ophthalmic solution   No No   Sig: Administer 1 drop to both eyes every 4 (four) hours X 5 days   Patient not taking: Reported on 2023   rizatriptan (MAXALT) 10 mg tablet   No No   Sig: Take 1 tablet (10 mg total) by mouth as needed for migraine Take at the onset of migraine; if symptoms continue or return, may take another dose at least 2 hours after first dose. Take no more than 2 doses in a day. Patient not taking: Reported on 2023      Facility-Administered Medications: None       Past Medical History:   Diagnosis Date    Irritable bowel syndrome     last assessed: 10/23/2015    Maternal obesity, antepartum     last assessed: 2017    Migraine        Past Surgical History:   Procedure Laterality Date     SECTION      FOOT SURGERY         Family History   Problem Relation Age of Onset    Irritable bowel syndrome Mother     Ovarian cancer Family     Ovarian cancer Family      I have reviewed and agree with the history as documented. E-Cigarette/Vaping    E-Cigarette Use Never User      E-Cigarette/Vaping Substances     Social History     Tobacco Use    Smoking status: Never    Smokeless tobacco: Never   Vaping Use    Vaping Use: Never used   Substance Use Topics    Alcohol use: No    Drug use: No       Review of Systems    Physical Exam  Physical Exam  Vitals and nursing note reviewed. Constitutional:       Appearance: Normal appearance. She is well-developed. HENT:      Head: Normocephalic and atraumatic. Nose: Congestion present. Eyes:      Conjunctiva/sclera: Conjunctivae normal.      Pupils: Pupils are equal, round, and reactive to light. Neck:      Trachea: No tracheal deviation. Cardiovascular:      Rate and Rhythm: Normal rate and regular rhythm. Heart sounds: Normal heart sounds. No murmur heard. Pulmonary:      Effort: Pulmonary effort is normal. No respiratory distress. Breath sounds: Normal breath sounds. No wheezing or rales. Abdominal:      General: Bowel sounds are normal. There is no distension. Palpations: Abdomen is soft. Tenderness: There is no abdominal tenderness. Musculoskeletal:         General: No deformity. Cervical back: Normal range of motion and neck supple. Skin:     General: Skin is warm and dry. Capillary Refill: Capillary refill takes less than 2 seconds. Neurological:      General: No focal deficit present. Mental Status: She is alert and oriented to person, place, and time. Sensory: No sensory deficit.    Psychiatric:         Mood and Affect: Mood normal.         Judgment: Judgment normal.         Vital Signs  ED Triage Vitals   Temperature Pulse Respirations Blood Pressure SpO2   10/12/23 2115 10/12/23 2115 10/12/23 2115 10/12/23 2115 10/12/23 2115   97.6 °F (36.4 °C) (!) 121 20 157/92 96 %      Temp Source Heart Rate Source Patient Position - Orthostatic VS BP Location FiO2 (%)   10/12/23 2115 10/12/23 2115 10/12/23 2115 10/12/23 2115 --   Tympanic Monitor Sitting Left arm       Pain Score       10/12/23 2236       5           Vitals:    10/12/23 2115 10/12/23 2129 10/12/23 2256   BP: 157/92     Pulse: (!) 121 99 98   Patient Position - Orthostatic VS: Sitting           Visual Acuity      ED Medications  Medications   albuterol (PROVENTIL HFA,VENTOLIN HFA) inhaler 2 puff (2 puffs Inhalation Given 10/12/23 2150)   sodium chloride 0.9 % bolus 1,000 mL (0 mL Intravenous Stopped 10/12/23 2300)   ketorolac (TORADOL) injection 15 mg (15 mg Intravenous Given 10/12/23 2236)   benzonatate (TESSALON PERLES) capsule 100 mg (100 mg Oral Given 10/12/23 2307)       Diagnostic Studies  Results Reviewed       Procedure Component Value Units Date/Time    Lactic acid [[id number]]  (Normal) Collected: 10/12/23 2217    Lab Status: Final result Specimen: Blood from Arm, Left Updated: 10/12/23 2242     LACTIC ACID 1.3 mmol/L     Narrative:      Result may be elevated if tourniquet was used during collection. Urine Microscopic [[id number]]  (Abnormal) Collected: 10/12/23 2218    Lab Status: Final result Specimen: Urine, Clean Catch Updated: 10/12/23 2239     RBC, UA 0-1 /hpf      WBC, UA None Seen /hpf      Epithelial Cells Moderate /hpf      Bacteria, UA None Seen /hpf     UA w Reflex to Microscopic w Reflex to Culture [[id number]]  (Abnormal) Collected: 10/12/23 2218    Lab Status: Final result Specimen: Urine, Clean Catch Updated: 10/12/23 2227     Color, UA Yellow     Clarity, UA Clear     Specific Gravity, UA >=1.030     pH, UA 5.5     Leukocytes, UA Negative     Nitrite, UA Negative     Protein, UA Negative mg/dl      Glucose, UA Negative mg/dl      Ketones, UA Negative mg/dl      Urobilinogen, UA 0.2 E.U./dl      Bilirubin, UA Negative     Occult Blood, UA Trace-Intact    HS Troponin 0hr (reflex protocol) [[id number]]  (Normal) Collected: 10/12/23 2151    Lab Status: Final result Specimen: Blood from Arm, Right Updated: 10/12/23 2226     hs TnI 0hr <2 ng/L     Protime-INR [[id number]]  (Normal) Collected: 10/12/23 2151    Lab Status: Final result Specimen: Blood from Arm, Right Updated: 10/12/23 2223     Protime 13.0 seconds      INR 0.96    APTT [[id number]]  (Normal) Collected: 10/12/23 2151    Lab Status: Final result Specimen: Blood from Arm, Right Updated: 10/12/23 2223     PTT 29 seconds     Blood culture #1 [[id number]] Collected: 10/12/23 2217    Lab Status: In process Specimen: Blood from Arm, Right Updated: 10/12/23 2222    Blood culture #2 [[id number]] Collected: 10/12/23 2217    Lab Status:  In process Specimen: Blood from Arm, Left Updated: 10/12/23 5043    Basic metabolic panel [[id number]] Collected: 10/12/23 2151    Lab Status: Final result Specimen: Blood from Arm, Right Updated: 10/12/23 2217     Sodium 135 mmol/L      Potassium 4.0 mmol/L Chloride 102 mmol/L      CO2 22 mmol/L      ANION GAP 11 mmol/L      BUN 24 mg/dL      Creatinine 0.81 mg/dL      Glucose 100 mg/dL      Calcium 9.6 mg/dL      eGFR 97 ml/min/1.73sq m     Narrative:      [facility] guidelines for Chronic Kidney Disease (CKD):     Stage 1 with normal or high GFR (GFR > 90 mL/min/1.73 square meters)    Stage 2 Mild CKD (GFR = 60-89 mL/min/1.73 square meters)    Stage 3A Moderate CKD (GFR = 45-59 mL/min/1.73 square meters)    Stage 3B Moderate CKD (GFR = 30-44 mL/min/1.73 square meters)    Stage 4 Severe CKD (GFR = 15-29 mL/min/1.73 square meters)    Stage 5 End Stage CKD (GFR <15 mL/min/1.73 square meters)  Note: GFR calculation is accurate only with a steady state creatinine    D-dimer, quantitative [[id number]]  (Normal) Collected: 10/12/23 2151    Lab Status: Final result Specimen: Blood from Arm, Right Updated: 10/12/23 2215     D-Dimer, Quant 0.46 ug/ml FEU     TSH [[id number]]     Lab Status: No result Specimen: Blood     Procalcitonin [[id number]]     Lab Status: No result Specimen: Blood     POCT pregnancy, urine [[id number]]  (Normal) Resulted: 10/12/23 2207    Lab Status: Final result Updated: 10/12/23 2207     EXT Preg Test, Ur Negative     Control Valid    CBC and differential [[id number]]  (Abnormal) Collected: 10/12/23 2151    Lab Status: Final result Specimen: Blood from Arm, Right Updated: 10/12/23 2200     WBC 14.86 Thousand/uL      RBC 4.63 Million/uL      Hemoglobin 14.2 g/dL      Hematocrit 43.8 %      MCV 95 fL      MCH 30.7 pg      MCHC 32.4 g/dL      RDW 12.1 %      MPV 9.7 fL      Platelets 601 Thousands/uL      nRBC 0 /100 WBCs      Neutrophils Relative 61 %      Immat GRANS % 1 %      Lymphocytes Relative 28 %      Monocytes Relative 6 %      Eosinophils Relative 3 %      Basophils Relative 1 %      Neutrophils Absolute 9.21 Thousands/µL      Immature Grans Absolute 0.10 Thousand/uL      Lymphocytes Absolute 4.22 Thousands/µL Monocytes Absolute 0.86 Thousand/µL      Eosinophils Absolute 0.38 Thousand/µL      Basophils Absolute 0.09 Thousands/µL     FLU/RSV/COVID - if FLU/RSV clinically relevant [[id number]]  (Normal) Collected: 10/12/23 2116    Lab Status: Final result Specimen: Nares from Nose Updated: 10/12/23 2158     SARS-CoV-2 Negative     INFLUENZA A PCR Negative     INFLUENZA B PCR Negative     RSV PCR Negative    Narrative:      FOR PEDIATRIC PATIENTS - copy/paste COVID Guidelines URL to browser: [email or web address]    SARS-CoV-2 assay is a Nucleic Acid Amplification assay intended for the  qualitative detection of nucleic acid from SARS-CoV-2 in nasopharyngeal  swabs. Results are for the presumptive identification of SARS-CoV-2 RNA. Positive results are indicative of infection with SARS-CoV-2, the virus  causing COVID-19, but do not rule out bacterial infection or co-infection  with other viruses. Laboratories within the [facility] and its  territories are required to report all positive results to the appropriate  public health authorities. Negative results do not preclude SARS-CoV-2  infection and should not be used as the sole basis for treatment or other  patient management decisions. Negative results must be combined with  clinical observations, patient history, and epidemiological information. This test has not been FDA cleared or approved. This test has been authorized by FDA under an Emergency Use Authorization  (EUA). This test is only authorized for the duration of time the  declaration that circumstances exist justifying the authorization of the  emergency use of an in vitro diagnostic tests for detection of SARS-CoV-2  virus and/or diagnosis of COVID-19 infection under section 564(b)(1) of  the Act, 21 U. S.C. 248FHS-1(Q)(4), unless the authorization is terminated  or revoked sooner.  The test has been validated but independent review by FDA  and CLIA is pending. Test performed using [employer] GeneXpert: This RT-PCR assay targets N2,  a region unique to SARS-CoV-2. A conserved region in the E-gene was chosen  for pan-Sarbecovirus detection which includes SARS-CoV-2. According to CMS-2020-01-R, this platform meets the definition of high-throughput technology. XR chest 1 view portable    (Results Pending)              Procedures  ECG 12 Lead Documentation Only    Date/Time: 10/12/2023 9:57 PM    Performed by: Maida Bianchi DO  Authorized by: Maida Bianchi DO    Indications / Diagnosis:  CP  Patient location:  ED  Interpretation:     Interpretation: abnormal    Rate:     ECG rate:  107    ECG rate assessment: tachycardic    Rhythm:     Rhythm: sinus tachycardia    Conduction:     Conduction: normal    ST segments:     ST segments:  Normal  T waves:     T waves: normal             ED Course  ED Course as of 10/12/23 2314   Thu Oct 12, 2023   2155 Chest x-ray reviewed and interpreted independently by me, no acute disease noted. 2205 WBC(!): 14.86  Patient was on steroids recently. Chest x-ray negative for pneumonia. 2219 D-Dimer, Quant: 0.46  Negative   2230 Work-up today has been reassuring, troponin negative. Patient still slightly tachycardic, will give Toradol for antipyresis, IV fluids and monitor. Specific gravity suggest mild dehydration. TSH is pending although clinically doubt thyroid storm or endocrinologic etiology. 2256 HR improving   2306 Patient comfortable discharge, return precautions provided. SBIRT 20yo+      Flowsheet Row Most Recent Value   Initial Alcohol Screen: US AUDIT-C     1. How often do you have a drink containing alcohol? 0 Filed at: 10/12/2023 2115   2. How many drinks containing alcohol do you have on a typical day you are drinking? 0 Filed at: 10/12/2023 2115   3a. Male UNDER 65: How often do you have five or more drinks on one occasion?  0 Filed at: 10/12/2023 2115   3b. FEMALE Any Age, or MALE 65+: How often do you have 4 or more drinks on one occassion? 0 Filed at: 10/12/2023 2115   Audit-C Score 0 Filed at: 10/12/2023 2115   VIKY: How many times in the past year have you. .. Used an illegal drug or used a prescription medication for non-medical reasons? Never Filed at: 10/12/2023 2115                      Medical Decision Making  27-year-old female with history of PE  Anticoagulation presents with shortness of breath, palpitations, mildly tachycardic at triage however improved during exam however heart rate was still around 100. She does take Motrin Tylenol prior to arrival.  I do suspect this is likely her fever starting to resolve. However given her symptoms additional work-up is needed to exclude PE, pericarditis, pneumonia. I suspect she has a viral syndrome such as RSV or bronchitis, will get x-ray to rule out infiltrate, pulmonary edema. Get troponin to rule out myocarditis although clinically doubtful. Will get RSV COVID test to rule out COVID and RSV as well as influenza. Will give IV fluids, she is not actively wheezing though has bronchitis type cough during exam so we will trial albuterol treatment. Prior notes reviewed, patient did have recent spontaneous miscarriage slightly over a month ago. We will get CBC rule out anemia. Amount and/or Complexity of Data Reviewed  Labs: ordered. Decision-making details documented in ED Course. Radiology: ordered and independent interpretation performed. Decision-making details documented in ED Course. Risk  Prescription drug management.              Disposition  Final diagnoses:   Viral syndrome   Bronchitis     Time reflects when diagnosis was documented in both MDM as applicable and the Disposition within this note       Time User Action Codes Description Comment    10/12/2023 10:57 PM Catina Smith Add [B34.9] Viral syndrome     10/12/2023 10:57 PM Catina Smith Add [J40] Bronchitis ED Disposition       ED Disposition   Discharge    Condition   Stable    Date/Time   Thu Oct 12, 2023 3356    Comment   Brandt Simeon discharge to home/self care.                    Follow-up Information       Follow up With Specialties Details Why [address] [facility], [address] [facility], Nurse Practitioner Schedule an appointment as soon as possible for a visit  As needed [address]  [address] [facility]  [phone]              Discharge Medication List as of 10/12/2023 10:58 PM        START taking these medications    Details   benzonatate (TESSALON PERLES) 100 mg capsule Take 1 capsule (100 mg total) by mouth 3 (three) times a day as needed for cough, Starting Thu 10/12/2023, Normal           CONTINUE these medications which have NOT CHANGED    Details   albuterol (ProAir HFA) 90 mcg/act inhaler Inhale 2 puffs every 6 (six) hours as needed for wheezing or shortness of breath, Starting Fri 11/18/2022, Normal      clonazePAM (KlonoPIN) 0.5 mg tablet Take 1 tablet (0.5 mg total) by mouth 2 (two) times a day As needed, Starting Thu 4/13/2023, Normal      doxycycline monohydrate (MONODOX) 100 mg capsule Take 1 capsule (100 mg total) by mouth 2 (two) times a day for 10 days, Starting Mon 10/2/2023, Until Thu 10/12/2023, Normal      ergocalciferol (VITAMIN D2) 50,000 units Take 1 capsule (50,000 Units total) by mouth once a week, Starting Thu 4/13/2023, Normal      guaifenesin-codeine (GUAIFENESIN AC) 100-10 MG/5ML liquid Take 5 mL by mouth 3 (three) times a day as needed for cough, Starting Mon 10/2/2023, Normal      methylPREDNISolone 4 MG tablet therapy pack Use as directed on package, Normal      metoprolol tartrate (LOPRESSOR) 25 mg tablet Take 1 tablet (25 mg total) by mouth every 12 (twelve) hours, Starting Thu 4/13/2023, Normal      PARoxetine (PAXIL) 40 MG tablet Take 1 tablet (40 mg total) by mouth every morning, Starting Thu 4/13/2023, Normal      polymyxin b-trimethoprim (POLYTRIM) ophthalmic solution Administer 1 drop to both eyes every 4 (four) hours X 5 days, Starting Wed 7/12/2023, Normal      rizatriptan (MAXALT) 10 mg tablet Take 1 tablet (10 mg total) by mouth as needed for migraine Take at the onset of migraine; if symptoms continue or return, may take another dose at least 2 hours after first dose. Take no more than 2 doses in a day., Starting Thu 7/28/2022, Normal             No discharge procedures on file.     PDMP Review         Value Time User    PDMP Reviewed  Yes 4/13/2023  3:25 PM Royce Hanks, [facility]            ED Provider  Electronically Signed by             Niesha Flynn DO  10/12/23 9436

## 2024-02-06 NOTE — PROGRESS NOTES
12/09/20 0050   Provider Notification   Reason for Communication Change in status   Provider Name Other (comment)  Jarett Route)   Method of Communication Page   Response Waiting for response   Notification Time [phone]   just FYI update- received pt on 5L nc Called MsRiaz Kalijenyflorina to see how she was doing. Left a message with my contact information and asked her to call back at her convenience.

## 2025-02-14 ENCOUNTER — E-ADVICE (OUTPATIENT)
Dept: UROLOGY | Age: 75
End: 2025-02-14

## 2025-03-19 ENCOUNTER — OFFICE VISIT (OUTPATIENT)
Dept: UROLOGY | Age: 75
End: 2025-03-19

## 2025-03-19 VITALS — HEIGHT: 72 IN | BODY MASS INDEX: 25.06 KG/M2 | WEIGHT: 185 LBS

## 2025-03-19 DIAGNOSIS — R21 PENILE RASH: ICD-10-CM

## 2025-03-19 DIAGNOSIS — Z12.5 PROSTATE CANCER SCREENING: ICD-10-CM

## 2025-03-19 DIAGNOSIS — R30.0 DYSURIA: ICD-10-CM

## 2025-03-19 DIAGNOSIS — N40.1 BENIGN PROSTATIC HYPERPLASIA WITH WEAK URINARY STREAM: Primary | ICD-10-CM

## 2025-03-19 DIAGNOSIS — R39.12 BENIGN PROSTATIC HYPERPLASIA WITH WEAK URINARY STREAM: Primary | ICD-10-CM

## 2025-03-19 LAB — BLDR SCAN MLS: NORMAL

## 2025-03-19 RX ORDER — ACETAMINOPHEN 500 MG
1000 TABLET ORAL EVERY 6 HOURS PRN
COMMUNITY

## 2025-03-19 RX ORDER — SULFAMETHOXAZOLE AND TRIMETHOPRIM 800; 160 MG/1; MG/1
1 TABLET ORAL 2 TIMES DAILY
Qty: 60 TABLET | Refills: 0 | Status: SHIPPED | OUTPATIENT
Start: 2025-03-19

## 2025-04-18 ENCOUNTER — APPOINTMENT (OUTPATIENT)
Dept: UROLOGY | Age: 75
End: 2025-04-18

## 2025-06-27 ENCOUNTER — APPOINTMENT (OUTPATIENT)
Dept: UROLOGY | Age: 75
End: 2025-06-27

## 2025-10-03 ENCOUNTER — APPOINTMENT (OUTPATIENT)
Dept: UROLOGY | Age: 75
End: 2025-10-03

## (undated) NOTE — LETTER
[facility] [address], [address]  Consent for Procedure/Sedation    Date:     Time:       1.  I authorize the performance upon Frederick Solid the following:cardiac catheterization, left ventricular cineangiography, bilateral select Signature of person authorized                                     Relationship to  to consent for patient: _________________________ patient: ___________________    Witness: _______________________________ Date: _____________________    Printed: 10/15/202

## (undated) NOTE — LETTER
[facility] [address], [address]  Consent for Procedure/Sedation    Date:     Time:       1.  I authorize the performance upon [facility] the following:cardiac catheterization, left ventricular cineangiography, bilateral select period, the physician will determine when the applicable recovery period ends for purposes of reinstating the Do Not Resuscitate (DNR) order.     Signature of Patient: ____________________________________________________    Signature of person authorized

## (undated) NOTE — LETTER
[facility] [address], [address]  Consent for Procedure/Sedation    Date:     Time:       1.  I authorize the performance upon Feliberto Farmer the following:cardiac catheterization, left ventricular cineangiography, bilateral select period, the physician will determine when the applicable recovery period ends for purposes of reinstating the Do Not Resuscitate (DNR) order.     Signature of Patient: ____________________________________________________    Signature of person authorized